# Patient Record
Sex: FEMALE | Race: WHITE | HISPANIC OR LATINO | Employment: UNEMPLOYED | URBAN - METROPOLITAN AREA
[De-identification: names, ages, dates, MRNs, and addresses within clinical notes are randomized per-mention and may not be internally consistent; named-entity substitution may affect disease eponyms.]

---

## 2020-04-14 ENCOUNTER — TELEPHONE (OUTPATIENT)
Dept: OBSTETRICS AND GYNECOLOGY | Facility: CLINIC | Age: 30
End: 2020-04-14

## 2020-04-14 NOTE — TELEPHONE ENCOUNTER
Spoke to patient and scheduled her appointment for 04/20/2020 at 10:00am to see Dr. Munguia at the [facility] location. Patient verbalized understanding.

## 2020-04-14 NOTE — TELEPHONE ENCOUNTER
----- Message from Ruben Graham sent at 4/14/2020 10:17 AM CDT -----  Contact: Halie ( [facility])   Would  Like to setup a new pt appt for ob check.         ..[phone]

## 2020-04-22 ENCOUNTER — OFFICE VISIT (OUTPATIENT)
Dept: OBSTETRICS AND GYNECOLOGY | Facility: CLINIC | Age: 30
End: 2020-04-22
Payer: MEDICAID

## 2020-04-22 ENCOUNTER — TELEPHONE (OUTPATIENT)
Dept: OBSTETRICS AND GYNECOLOGY | Facility: CLINIC | Age: 30
End: 2020-04-22

## 2020-04-22 ENCOUNTER — LAB VISIT (OUTPATIENT)
Dept: LAB | Facility: HOSPITAL | Age: 30
End: 2020-04-22
Attending: OBSTETRICS & GYNECOLOGY
Payer: MEDICAID

## 2020-04-22 ENCOUNTER — PROCEDURE VISIT (OUTPATIENT)
Dept: OBSTETRICS AND GYNECOLOGY | Facility: CLINIC | Age: 30
End: 2020-04-22
Payer: MEDICAID

## 2020-04-22 VITALS
DIASTOLIC BLOOD PRESSURE: 80 MMHG | BODY MASS INDEX: 29.78 KG/M2 | WEIGHT: 161.81 LBS | SYSTOLIC BLOOD PRESSURE: 138 MMHG | HEIGHT: 62 IN

## 2020-04-22 DIAGNOSIS — I10 ESSENTIAL HYPERTENSION: ICD-10-CM

## 2020-04-22 DIAGNOSIS — Z32.00 VISIT FOR CONFIRMATION OF PREGNANCY TEST RESULT WITH PHYSICAL EXAM: ICD-10-CM

## 2020-04-22 DIAGNOSIS — Z32.00 VISIT FOR CONFIRMATION OF PREGNANCY TEST RESULT WITH PHYSICAL EXAM: Primary | ICD-10-CM

## 2020-04-22 LAB
ALBUMIN SERPL BCP-MCNC: 3.6 G/DL (ref 3.5–5.2)
ALP SERPL-CCNC: 76 U/L (ref 55–135)
ALT SERPL W/O P-5'-P-CCNC: 22 U/L (ref 10–44)
ANION GAP SERPL CALC-SCNC: 8 MMOL/L (ref 8–16)
AST SERPL-CCNC: 17 U/L (ref 10–40)
BACTERIA #/AREA URNS HPF: ABNORMAL /HPF
BASOPHILS # BLD AUTO: 0.05 K/UL (ref 0–0.2)
BASOPHILS NFR BLD: 0.4 % (ref 0–1.9)
BILIRUB SERPL-MCNC: 0.1 MG/DL (ref 0.1–1)
BILIRUB UR QL STRIP: NEGATIVE
BUN SERPL-MCNC: 6 MG/DL (ref 6–20)
CALCIUM SERPL-MCNC: 9.3 MG/DL (ref 8.7–10.5)
CAOX CRY URNS QL MICRO: ABNORMAL
CHLORIDE SERPL-SCNC: 105 MMOL/L (ref 95–110)
CLARITY UR: ABNORMAL
CO2 SERPL-SCNC: 24 MMOL/L (ref 23–29)
COLOR UR: YELLOW
CREAT SERPL-MCNC: 0.7 MG/DL (ref 0.5–1.4)
CREAT UR-MCNC: 85 MG/DL (ref 15–325)
DIFFERENTIAL METHOD: ABNORMAL
EOSINOPHIL # BLD AUTO: 0.4 K/UL (ref 0–0.5)
EOSINOPHIL NFR BLD: 3.2 % (ref 0–8)
ERYTHROCYTE [DISTWIDTH] IN BLOOD BY AUTOMATED COUNT: 12.9 % (ref 11.5–14.5)
EST. GFR  (AFRICAN AMERICAN): >60 ML/MIN/1.73 M^2
EST. GFR  (NON AFRICAN AMERICAN): >60 ML/MIN/1.73 M^2
GLUCOSE SERPL-MCNC: 75 MG/DL (ref 70–110)
GLUCOSE UR QL STRIP: NEGATIVE
HCT VFR BLD AUTO: 39.3 % (ref 37–48.5)
HGB BLD-MCNC: 11.9 G/DL (ref 12–16)
HGB UR QL STRIP: ABNORMAL
IMM GRANULOCYTES # BLD AUTO: 0.06 K/UL (ref 0–0.04)
IMM GRANULOCYTES NFR BLD AUTO: 0.5 % (ref 0–0.5)
KETONES UR QL STRIP: NEGATIVE
LEUKOCYTE ESTERASE UR QL STRIP: ABNORMAL
LYMPHOCYTES # BLD AUTO: 3 K/UL (ref 1–4.8)
LYMPHOCYTES NFR BLD: 26.7 % (ref 18–48)
MCH RBC QN AUTO: 28.5 PG (ref 27–31)
MCHC RBC AUTO-ENTMCNC: 30.3 G/DL (ref 32–36)
MCV RBC AUTO: 94 FL (ref 82–98)
MICROSCOPIC COMMENT: ABNORMAL
MONOCYTES # BLD AUTO: 0.6 K/UL (ref 0.3–1)
MONOCYTES NFR BLD: 5.1 % (ref 4–15)
NEUTROPHILS # BLD AUTO: 7.3 K/UL (ref 1.8–7.7)
NEUTROPHILS NFR BLD: 64.1 % (ref 38–73)
NITRITE UR QL STRIP: POSITIVE
NRBC BLD-RTO: 0 /100 WBC
PH UR STRIP: 6 [PH] (ref 5–8)
PLATELET # BLD AUTO: 272 K/UL (ref 150–350)
PMV BLD AUTO: 10.6 FL (ref 9.2–12.9)
POTASSIUM SERPL-SCNC: 3.7 MMOL/L (ref 3.5–5.1)
PROT SERPL-MCNC: 7.3 G/DL (ref 6–8.4)
PROT UR QL STRIP: NEGATIVE
PROT UR-MCNC: <7 MG/DL (ref 0–15)
PROT/CREAT UR: NORMAL MG/G{CREAT} (ref 0–0.2)
RBC # BLD AUTO: 4.18 M/UL (ref 4–5.4)
RBC #/AREA URNS HPF: 1 /HPF (ref 0–4)
SODIUM SERPL-SCNC: 137 MMOL/L (ref 136–145)
SP GR UR STRIP: 1.02 (ref 1–1.03)
SQUAMOUS #/AREA URNS HPF: 6 /HPF
URN SPEC COLLECT METH UR: ABNORMAL
WBC # BLD AUTO: 11.4 K/UL (ref 3.9–12.7)
WBC #/AREA URNS HPF: 10 /HPF (ref 0–5)

## 2020-04-22 PROCEDURE — 86850 RBC ANTIBODY SCREEN: CPT

## 2020-04-22 PROCEDURE — 87088 URINE BACTERIA CULTURE: CPT

## 2020-04-22 PROCEDURE — 85025 COMPLETE CBC W/AUTO DIFF WBC: CPT

## 2020-04-22 PROCEDURE — 99204 OFFICE O/P NEW MOD 45 MIN: CPT | Mod: S$PBB,,, | Performed by: OBSTETRICS & GYNECOLOGY

## 2020-04-22 PROCEDURE — 87186 SC STD MICRODIL/AGAR DIL: CPT

## 2020-04-22 PROCEDURE — 76801 PR US, OB <14WKS, TRANSABD, SINGLE GESTATION: ICD-10-PCS | Mod: 26,S$PBB,, | Performed by: OBSTETRICS & GYNECOLOGY

## 2020-04-22 PROCEDURE — 99999 PR PBB SHADOW E&M-EST. PATIENT-LVL III: CPT | Mod: PBBFAC,,, | Performed by: OBSTETRICS & GYNECOLOGY

## 2020-04-22 PROCEDURE — 86703 HIV-1/HIV-2 1 RESULT ANTBDY: CPT

## 2020-04-22 PROCEDURE — 86762 RUBELLA ANTIBODY: CPT

## 2020-04-22 PROCEDURE — 87340 HEPATITIS B SURFACE AG IA: CPT

## 2020-04-22 PROCEDURE — 99999 PR PBB SHADOW E&M-EST. PATIENT-LVL III: ICD-10-PCS | Mod: PBBFAC,,, | Performed by: OBSTETRICS & GYNECOLOGY

## 2020-04-22 PROCEDURE — 80053 COMPREHEN METABOLIC PANEL: CPT

## 2020-04-22 PROCEDURE — 99213 OFFICE O/P EST LOW 20 MIN: CPT | Mod: PBBFAC,25 | Performed by: OBSTETRICS & GYNECOLOGY

## 2020-04-22 PROCEDURE — 87491 CHLMYD TRACH DNA AMP PROBE: CPT

## 2020-04-22 PROCEDURE — 99204 PR OFFICE/OUTPT VISIT, NEW, LEVL IV, 45-59 MIN: ICD-10-PCS | Mod: S$PBB,,, | Performed by: OBSTETRICS & GYNECOLOGY

## 2020-04-22 PROCEDURE — 84156 ASSAY OF PROTEIN URINE: CPT

## 2020-04-22 PROCEDURE — 81000 URINALYSIS NONAUTO W/SCOPE: CPT

## 2020-04-22 PROCEDURE — 87086 URINE CULTURE/COLONY COUNT: CPT

## 2020-04-22 PROCEDURE — 87077 CULTURE AEROBIC IDENTIFY: CPT

## 2020-04-22 PROCEDURE — 76801 OB US < 14 WKS SINGLE FETUS: CPT | Mod: PBBFAC | Performed by: OBSTETRICS & GYNECOLOGY

## 2020-04-22 PROCEDURE — 36415 COLL VENOUS BLD VENIPUNCTURE: CPT

## 2020-04-22 PROCEDURE — 76801 OB US < 14 WKS SINGLE FETUS: CPT | Mod: 26,S$PBB,, | Performed by: OBSTETRICS & GYNECOLOGY

## 2020-04-22 PROCEDURE — 86592 SYPHILIS TEST NON-TREP QUAL: CPT

## 2020-04-22 RX ORDER — ONDANSETRON 4 MG/1
4 TABLET, FILM COATED ORAL EVERY 8 HOURS PRN
COMMUNITY
Start: 2018-10-30 | End: 2020-05-20

## 2020-04-22 RX ORDER — LABETALOL 100 MG/1
100 TABLET, FILM COATED ORAL 2 TIMES DAILY
COMMUNITY
Start: 2020-04-14 | End: 2020-05-20 | Stop reason: SDUPTHER

## 2020-04-22 RX ORDER — PNV NO.95/FERROUS FUM/FOLIC AC 28MG-0.8MG
TABLET ORAL
COMMUNITY
Start: 2020-04-14

## 2020-04-22 NOTE — TELEPHONE ENCOUNTER
----- Message from Fartun Munguia MD sent at 4/22/2020  3:12 PM CDT -----  Can you instruct pt to start taking baby ASA next week. Forgot to tell her at visit

## 2020-04-22 NOTE — TELEPHONE ENCOUNTER
Used language line to contact pt, notified to start baby aspirin daily starting next week. Patient verbalized understanding

## 2020-04-22 NOTE — PROGRESS NOTES
CC: Well woman exam    Essie JOAQUIN is a 29 y.o. female  presents for pregnancy confirmation visit. LMP: Patient's last menstrual period was 2020. h/o HTN, was on lisinopril, not placed on labetalol 100 mg bid.  No issues, problems, or complaints.  **Irish speaking,  used    Past Medical History:   Diagnosis Date    Hypertension      History reviewed. No pertinent surgical history.  Social History     Socioeconomic History    Marital status: Single     Spouse name: Not on file    Number of children: Not on file    Years of education: Not on file    Highest education level: Not on file   Occupational History    Not on file   Social Needs    Financial resource strain: Not on file    Food insecurity:     Worry: Not on file     Inability: Not on file    Transportation needs:     Medical: Not on file     Non-medical: Not on file   Tobacco Use    Smoking status: Never Smoker    Smokeless tobacco: Never Used   Substance and Sexual Activity    Alcohol use: Never     Frequency: Never    Drug use: Never    Sexual activity: Yes     Partners: Male     Birth control/protection: None   Lifestyle    Physical activity:     Days per week: Not on file     Minutes per session: Not on file    Stress: Not on file   Relationships    Social connections:     Talks on phone: Not on file     Gets together: Not on file     Attends Church service: Not on file     Active member of club or organization: Not on file     Attends meetings of clubs or organizations: Not on file     Relationship status: Not on file   Other Topics Concern    Not on file   Social History Narrative    Not on file     Family History   Problem Relation Age of Onset    Diabetes Mother     Hypertension Mother     Diabetes Sister     Hypertension Sister      OB History        5    Para   4    Term   3       1    AB        Living   4       SAB        TAB        Ectopic        Multiple        Live  "Births   4                 Current Outpatient Medications:     labetaloL (NORMODYNE) 100 MG tablet, Take 100 mg by mouth 2 (two) times a day., Disp: , Rfl:     ondansetron (ZOFRAN) 4 MG tablet, Take 4 mg by mouth every 8 (eight) hours as needed., Disp: , Rfl:     PRENATAL 28 mg iron- 800 mcg Tab, TK 1 T PO  D, Disp: , Rfl:     GYNECOLOGY HISTORY:  No abnormal pap/std    DATA REVIEWED:  Last pap: 2019 Results: normal    /80   Ht 5' 2" (1.575 m)   Wt 73.4 kg (161 lb 13.1 oz)   LMP 02/02/2020   BMI 29.60 kg/m²     ROS:  GENERAL: Denies weight gain or weight loss. Feeling well overall.   SKIN: Denies rash or lesions.   HEAD: Denies head injury or headache.   NODES: Denies enlarged lymph nodes.   CHEST: Denies chest pain or shortness of breath.   CARDIOVASCULAR: Denies palpitations or left sided chest pain.   ABDOMEN: No abdominal pain, constipation, diarrhea, nausea, vomiting or rectal bleeding.   URINARY: No frequency, dysuria, hematuria, or burning on urination.  REPRODUCTIVE: See HPI.   BREASTS: The patient denies pain, lumps, or nipple discharge.   HEMATOLOGIC: No easy bruisability or excessive bleeding.   MUSCULOSKELETAL: Denies joint pain or swelling.   NEUROLOGIC: Denies syncope or weakness.   PSYCHIATRIC: Denies depression, anxiety or mood swings.    PHYSICAL EXAM:    APPEARANCE: Well nourished, well developed, in no acute distress.  AFFECT: WNL, alert and oriented x 3  SKIN: No acne or hirsutism  NECK: Neck symmetric without masses or thyromegaly  NODES: No inguinal, cervical, axillary, or femoral lymph node enlargement  CHEST: Good respiratory effect  ABDOMEN: Soft.  No tenderness or masses.  No hepatosplenomegaly.  No hernias.  BREASTS: Symmetrical, no skin changes or visible lesions.  No palpable masses, nipple discharge bilaterally.  PELVIC: Normal external genitalia without lesions.  Normal hair distribution.  Adequate perineal body, normal urethral meatus.  Vagina moist and well rugated " without lesions or discharge.  Cervix pink, without lesions, discharge or tenderness.  No significant cystocele or rectocele.  Bimanual exam shows uterus to be normal size, regular, mobile and nontender.  Adnexa without masses or tenderness.    EXTREMITIES: No edema.     u/s: viable pregnancy 11w4d, HARRIETT 20    Visit for confirmation of pregnancy test result with physical exam  -     US OB/GYN Procedure ([facility]); Future  -     CBC auto differential; Future; Expected date: 2020  -     Hepatitis B Surface Antigen; Future; Expected date: 2020  -     HIV 1/2 Ag/Ab (4th Gen); Future; Expected date: 2020  -     Urinalysis  -     Type & Screen; Future; Expected date: 2020  -     Rubella Antibody, IgG; Future; Expected date: 2020  -     RPR; Future; Expected date: 2020  -     Urine culture  -     Comprehensive metabolic panel; Future; Expected date: 2020  -     Protein / creatinine ratio, urine  -     C. trachomatis/N. gonorrhoeae by AMP DNA    Essential hypertension  -     CBC auto differential; Future; Expected date: 2020  -     Hepatitis B Surface Antigen; Future; Expected date: 2020  -     HIV 1/2 Ag/Ab (4th Gen); Future; Expected date: 2020  -     Urinalysis  -     Type & Screen; Future; Expected date: 2020  -     Rubella Antibody, IgG; Future; Expected date: 2020  -     RPR; Future; Expected date: 2020  -     Urine culture  -     Comprehensive metabolic panel; Future; Expected date: 2020  -     Protein / creatinine ratio, urine    Patient was counseled today on  options. Ok to continue on labetalol, will need to start baby ASA next week

## 2020-04-23 ENCOUNTER — TELEPHONE (OUTPATIENT)
Dept: OBSTETRICS AND GYNECOLOGY | Facility: CLINIC | Age: 30
End: 2020-04-23

## 2020-04-23 LAB
ABO + RH BLD: NORMAL
BLD GP AB SCN CELLS X3 SERPL QL: NORMAL
HBV SURFACE AG SERPL QL IA: NEGATIVE
HIV 1+2 AB+HIV1 P24 AG SERPL QL IA: NEGATIVE
RPR SER QL: NORMAL
RUBV IGG SER-ACNC: 107 IU/ML
RUBV IGG SER-IMP: REACTIVE

## 2020-04-23 RX ORDER — NITROFURANTOIN 25; 75 MG/1; MG/1
100 CAPSULE ORAL 2 TIMES DAILY
Qty: 10 CAPSULE | Refills: 0 | Status: SHIPPED | OUTPATIENT
Start: 2020-04-23 | End: 2020-04-28

## 2020-04-23 NOTE — TELEPHONE ENCOUNTER
----- Message from Fartun Munguia MD sent at 4/23/2020  7:42 AM CDT -----  Pt has UTI. macrobid escripted

## 2020-04-24 LAB
C TRACH DNA SPEC QL NAA+PROBE: NOT DETECTED
N GONORRHOEA DNA SPEC QL NAA+PROBE: NOT DETECTED

## 2020-04-26 LAB — BACTERIA UR CULT: ABNORMAL

## 2020-05-15 ENCOUNTER — TELEPHONE (OUTPATIENT)
Dept: OBSTETRICS AND GYNECOLOGY | Facility: CLINIC | Age: 30
End: 2020-05-15

## 2020-05-15 NOTE — TELEPHONE ENCOUNTER
Forwarded Message to ALLISON Hernández for American translation. allison Adams    ----- Message from Rina Neal sent at 5/15/2020 12:57 PM CDT -----  Contact: Patient  Patient states that she was suppose to have a blood pressure monitor sent to her, but she is not sure, please call her back to clarify this patient will need a . Please call [phone]

## 2020-05-20 ENCOUNTER — INITIAL PRENATAL (OUTPATIENT)
Dept: OBSTETRICS AND GYNECOLOGY | Facility: CLINIC | Age: 30
End: 2020-05-20
Payer: MEDICAID

## 2020-05-20 VITALS — BODY MASS INDEX: 30.44 KG/M2 | WEIGHT: 166.44 LBS

## 2020-05-20 DIAGNOSIS — Z3A.15 PREGNANCY WITH 15 COMPLETED WEEKS GESTATION: ICD-10-CM

## 2020-05-20 DIAGNOSIS — O10.012 ESSENTIAL HYPERTENSION AFFECTING PREGNANCY IN SECOND TRIMESTER: Primary | ICD-10-CM

## 2020-05-20 PROCEDURE — 99212 PR OFFICE/OUTPT VISIT, EST, LEVL II, 10-19 MIN: ICD-10-PCS | Mod: TH,S$PBB,, | Performed by: OBSTETRICS & GYNECOLOGY

## 2020-05-20 PROCEDURE — 99999 PR PBB SHADOW E&M-EST. PATIENT-LVL II: CPT | Mod: PBBFAC,,, | Performed by: OBSTETRICS & GYNECOLOGY

## 2020-05-20 PROCEDURE — 99999 PR PBB SHADOW E&M-EST. PATIENT-LVL II: ICD-10-PCS | Mod: PBBFAC,,, | Performed by: OBSTETRICS & GYNECOLOGY

## 2020-05-20 PROCEDURE — 99212 OFFICE O/P EST SF 10 MIN: CPT | Mod: TH,S$PBB,, | Performed by: OBSTETRICS & GYNECOLOGY

## 2020-05-20 PROCEDURE — 99212 OFFICE O/P EST SF 10 MIN: CPT | Mod: PBBFAC,TH | Performed by: OBSTETRICS & GYNECOLOGY

## 2020-05-20 RX ORDER — LABETALOL 100 MG/1
100 TABLET, FILM COATED ORAL EVERY 12 HOURS
Qty: 60 TABLET | Refills: 6 | Status: ON HOLD | OUTPATIENT
Start: 2020-05-20 | End: 2020-08-21 | Stop reason: HOSPADM

## 2020-05-20 NOTE — PROGRESS NOTES
* on phone  Pt c/o bilateral feet pain & swelling-normal exam today; rec elevating feet  Also c/o occ left rib cage-normal abdominal exam; occ headaches-no vision changes; ok to take tylenol prn    /86. Did not take labetalol this morning, ran out yesterday  Next visit w/ Dr. Puente

## 2020-06-04 ENCOUNTER — TELEPHONE (OUTPATIENT)
Dept: OBSTETRICS AND GYNECOLOGY | Facility: CLINIC | Age: 30
End: 2020-06-04

## 2020-06-05 ENCOUNTER — TELEPHONE (OUTPATIENT)
Dept: OBSTETRICS AND GYNECOLOGY | Facility: CLINIC | Age: 30
End: 2020-06-05

## 2020-06-05 NOTE — TELEPHONE ENCOUNTER
Attempted to contact patient. No answer. Left message for patient to return call to clinic to assist in r/s her appointment. Patient is a Togolese speaker.

## 2020-06-12 ENCOUNTER — ROUTINE PRENATAL (OUTPATIENT)
Dept: OBSTETRICS AND GYNECOLOGY | Facility: CLINIC | Age: 30
End: 2020-06-12
Payer: MEDICAID

## 2020-06-12 VITALS
DIASTOLIC BLOOD PRESSURE: 88 MMHG | BODY MASS INDEX: 30.52 KG/M2 | WEIGHT: 166.88 LBS | SYSTOLIC BLOOD PRESSURE: 138 MMHG

## 2020-06-12 DIAGNOSIS — O10.012 PRE-EXISTING ESSENTIAL HTN COMP PREGNANCY, SECOND TRIMESTER: Primary | ICD-10-CM

## 2020-06-12 DIAGNOSIS — O09.892 HISTORY OF PRETERM DELIVERY, CURRENTLY PREGNANT IN SECOND TRIMESTER: ICD-10-CM

## 2020-06-12 DIAGNOSIS — Z87.59 HISTORY OF SEVERE PRE-ECLAMPSIA: ICD-10-CM

## 2020-06-12 PROCEDURE — 99212 OFFICE O/P EST SF 10 MIN: CPT | Mod: TH,S$PBB,, | Performed by: OBSTETRICS & GYNECOLOGY

## 2020-06-12 PROCEDURE — 99212 OFFICE O/P EST SF 10 MIN: CPT | Mod: PBBFAC,TH | Performed by: OBSTETRICS & GYNECOLOGY

## 2020-06-12 PROCEDURE — 99212 PR OFFICE/OUTPT VISIT, EST, LEVL II, 10-19 MIN: ICD-10-PCS | Mod: TH,S$PBB,, | Performed by: OBSTETRICS & GYNECOLOGY

## 2020-06-12 PROCEDURE — 99999 PR PBB SHADOW E&M-EST. PATIENT-LVL II: ICD-10-PCS | Mod: PBBFAC,,, | Performed by: OBSTETRICS & GYNECOLOGY

## 2020-06-12 PROCEDURE — 99999 PR PBB SHADOW E&M-EST. PATIENT-LVL II: CPT | Mod: PBBFAC,,, | Performed by: OBSTETRICS & GYNECOLOGY

## 2020-06-12 NOTE — PROGRESS NOTES
Pt reports no complaints.  Doing well.  Has been seeing Dr. Munguia and is transferring to me secondary to prefers [employer] communication.  History reviewed.  HTN with prior pregnancies with pre-eclampsia.  Last one was induced early for this reason.  Infant weighed 2 lb.  Denies any  labor history.  Risks and recommendations were reviewed with pt.  BP well controlled on Labetalol.  Daily ASA.  17-OHP not indicated.  Pt counseled on aneuploidy testing options.  Declines.  Anatomy ultrasound with next visit.  Second trimester talk.

## 2020-07-06 ENCOUNTER — ROUTINE PRENATAL (OUTPATIENT)
Dept: OBSTETRICS AND GYNECOLOGY | Facility: CLINIC | Age: 30
End: 2020-07-06
Payer: MEDICAID

## 2020-07-06 ENCOUNTER — PROCEDURE VISIT (OUTPATIENT)
Dept: OBSTETRICS AND GYNECOLOGY | Facility: CLINIC | Age: 30
End: 2020-07-06
Payer: MEDICAID

## 2020-07-06 VITALS
SYSTOLIC BLOOD PRESSURE: 132 MMHG | WEIGHT: 171.06 LBS | DIASTOLIC BLOOD PRESSURE: 80 MMHG | BODY MASS INDEX: 31.29 KG/M2

## 2020-07-06 DIAGNOSIS — O10.012 PRE-EXISTING ESSENTIAL HTN COMP PREGNANCY, SECOND TRIMESTER: ICD-10-CM

## 2020-07-06 DIAGNOSIS — O10.012 PRE-EXISTING ESSENTIAL HTN COMP PREGNANCY, SECOND TRIMESTER: Primary | ICD-10-CM

## 2020-07-06 PROCEDURE — 76805 OB US >/= 14 WKS SNGL FETUS: CPT | Mod: PBBFAC | Performed by: OBSTETRICS & GYNECOLOGY

## 2020-07-06 PROCEDURE — 99999 PR PBB SHADOW E&M-EST. PATIENT-LVL II: CPT | Mod: PBBFAC,,, | Performed by: OBSTETRICS & GYNECOLOGY

## 2020-07-06 PROCEDURE — 99212 OFFICE O/P EST SF 10 MIN: CPT | Mod: PBBFAC,TH | Performed by: OBSTETRICS & GYNECOLOGY

## 2020-07-06 PROCEDURE — 99212 OFFICE O/P EST SF 10 MIN: CPT | Mod: TH,S$PBB,, | Performed by: OBSTETRICS & GYNECOLOGY

## 2020-07-06 PROCEDURE — 99212 PR OFFICE/OUTPT VISIT, EST, LEVL II, 10-19 MIN: ICD-10-PCS | Mod: TH,S$PBB,, | Performed by: OBSTETRICS & GYNECOLOGY

## 2020-07-06 PROCEDURE — 76805 PR US, OB 14+WKS, TRANSABD, SINGLE GESTATION: ICD-10-PCS | Mod: 26,S$PBB,, | Performed by: OBSTETRICS & GYNECOLOGY

## 2020-07-06 PROCEDURE — 76805 OB US >/= 14 WKS SNGL FETUS: CPT | Mod: 26,S$PBB,, | Performed by: OBSTETRICS & GYNECOLOGY

## 2020-07-06 PROCEDURE — 99999 PR PBB SHADOW E&M-EST. PATIENT-LVL II: ICD-10-PCS | Mod: PBBFAC,,, | Performed by: OBSTETRICS & GYNECOLOGY

## 2020-07-06 NOTE — PROGRESS NOTES
US today: 430 g (18%), breech, 3VC, anterior placenta, NL fluid, CL normal, sub-optimal views  Pt reports no complaints and is doing well.  Counseled on ultrasound findings.  Repeat US with next visit for sub-optimal views.  Johanne-viability talk.  3rd trimester labs with next visit.  Labor precautions and kick counts.

## 2020-07-22 ENCOUNTER — TELEPHONE (OUTPATIENT)
Dept: OBSTETRICS AND GYNECOLOGY | Facility: CLINIC | Age: 30
End: 2020-07-22

## 2020-07-22 NOTE — TELEPHONE ENCOUNTER
----- Message from Pricila Mcnair sent at 7/22/2020  1:27 PM CDT -----  Pt is requesting a call back to reschedule appt on 08/03 for a later date. If poss would like to schedule after 08/11 Please call back at [phone]

## 2020-08-11 ENCOUNTER — TELEPHONE (OUTPATIENT)
Dept: OBSTETRICS AND GYNECOLOGY | Facility: CLINIC | Age: 30
End: 2020-08-11

## 2020-08-11 NOTE — TELEPHONE ENCOUNTER
Spoke to patient. Patient is c/o headaches that are not relieved by Tylenol. Patient stated that she is unable to take her blood pressure at home but is also feeling very weak and her eyes are hurting. Advised patient to go to L&D for eval. Patient verbalized understanding.

## 2020-08-14 ENCOUNTER — ROUTINE PRENATAL (OUTPATIENT)
Dept: OBSTETRICS AND GYNECOLOGY | Facility: CLINIC | Age: 30
End: 2020-08-14
Payer: MEDICAID

## 2020-08-14 ENCOUNTER — PROCEDURE VISIT (OUTPATIENT)
Dept: OBSTETRICS AND GYNECOLOGY | Facility: CLINIC | Age: 30
End: 2020-08-14
Payer: MEDICAID

## 2020-08-14 ENCOUNTER — HOSPITAL ENCOUNTER (INPATIENT)
Facility: HOSPITAL | Age: 30
LOS: 7 days | Discharge: HOME | End: 2020-08-21
Attending: OBSTETRICS & GYNECOLOGY | Admitting: OBSTETRICS & GYNECOLOGY
Payer: MEDICAID

## 2020-08-14 VITALS
BODY MASS INDEX: 31.94 KG/M2 | DIASTOLIC BLOOD PRESSURE: 110 MMHG | SYSTOLIC BLOOD PRESSURE: 160 MMHG | WEIGHT: 174.63 LBS

## 2020-08-14 DIAGNOSIS — O45.90 PLACENTA ABRUPTION, DELIVERED, CURRENT HOSPITALIZATION: ICD-10-CM

## 2020-08-14 DIAGNOSIS — O10.012 PRE-EXISTING ESSENTIAL HTN COMP PREGNANCY, SECOND TRIMESTER: ICD-10-CM

## 2020-08-14 DIAGNOSIS — Z87.59 HISTORY OF SEVERE PRE-ECLAMPSIA: ICD-10-CM

## 2020-08-14 DIAGNOSIS — O11.9 CHRONIC HYPERTENSION WITH SUPERIMPOSED PRE-ECLAMPSIA: ICD-10-CM

## 2020-08-14 DIAGNOSIS — O10.012 PRE-EXISTING ESSENTIAL HTN COMP PREGNANCY, SECOND TRIMESTER: Primary | ICD-10-CM

## 2020-08-14 DIAGNOSIS — Z98.891 STATUS POST PRIMARY LOW TRANSVERSE CESAREAN SECTION: Primary | ICD-10-CM

## 2020-08-14 LAB
ALBUMIN SERPL BCP-MCNC: 3 G/DL (ref 3.5–5.2)
ALP SERPL-CCNC: 149 U/L (ref 55–135)
ALT SERPL W/O P-5'-P-CCNC: 32 U/L (ref 10–44)
ANION GAP SERPL CALC-SCNC: 11 MMOL/L (ref 8–16)
AST SERPL-CCNC: 22 U/L (ref 10–40)
BASOPHILS # BLD AUTO: 0.05 K/UL (ref 0–0.2)
BASOPHILS NFR BLD: 0.4 % (ref 0–1.9)
BILIRUB SERPL-MCNC: 0.2 MG/DL (ref 0.1–1)
BUN SERPL-MCNC: 11 MG/DL (ref 6–20)
CALCIUM SERPL-MCNC: 9.1 MG/DL (ref 8.7–10.5)
CHLORIDE SERPL-SCNC: 106 MMOL/L (ref 95–110)
CO2 SERPL-SCNC: 20 MMOL/L (ref 23–29)
CREAT SERPL-MCNC: 0.7 MG/DL (ref 0.5–1.4)
CREAT UR-MCNC: 48.4 MG/DL (ref 15–325)
DIFFERENTIAL METHOD: ABNORMAL
EOSINOPHIL # BLD AUTO: 0.2 K/UL (ref 0–0.5)
EOSINOPHIL NFR BLD: 2 % (ref 0–8)
ERYTHROCYTE [DISTWIDTH] IN BLOOD BY AUTOMATED COUNT: 12.6 % (ref 11.5–14.5)
EST. GFR  (AFRICAN AMERICAN): >60 ML/MIN/1.73 M^2
EST. GFR  (NON AFRICAN AMERICAN): >60 ML/MIN/1.73 M^2
GLUCOSE SERPL-MCNC: 66 MG/DL (ref 70–110)
HCT VFR BLD AUTO: 34.7 % (ref 37–48.5)
HGB BLD-MCNC: 11.5 G/DL (ref 12–16)
IMM GRANULOCYTES # BLD AUTO: 0.06 K/UL (ref 0–0.04)
IMM GRANULOCYTES NFR BLD AUTO: 0.5 % (ref 0–0.5)
LYMPHOCYTES # BLD AUTO: 3 K/UL (ref 1–4.8)
LYMPHOCYTES NFR BLD: 27 % (ref 18–48)
MCH RBC QN AUTO: 29.6 PG (ref 27–31)
MCHC RBC AUTO-ENTMCNC: 33.1 G/DL (ref 32–36)
MCV RBC AUTO: 89 FL (ref 82–98)
MONOCYTES # BLD AUTO: 0.5 K/UL (ref 0.3–1)
MONOCYTES NFR BLD: 4.4 % (ref 4–15)
NEUTROPHILS # BLD AUTO: 7.3 K/UL (ref 1.8–7.7)
NEUTROPHILS NFR BLD: 65.7 % (ref 38–73)
NRBC BLD-RTO: 0 /100 WBC
PLATELET # BLD AUTO: 246 K/UL (ref 150–350)
PMV BLD AUTO: 11.2 FL (ref 9.2–12.9)
POTASSIUM SERPL-SCNC: 4.1 MMOL/L (ref 3.5–5.1)
PROT SERPL-MCNC: 7 G/DL (ref 6–8.4)
PROT UR-MCNC: 52 MG/DL (ref 0–15)
PROT/CREAT UR: 1.07 MG/G{CREAT} (ref 0–0.2)
RBC # BLD AUTO: 3.89 M/UL (ref 4–5.4)
SARS-COV-2 RDRP RESP QL NAA+PROBE: NEGATIVE
SODIUM SERPL-SCNC: 137 MMOL/L (ref 136–145)
WBC # BLD AUTO: 11.18 K/UL (ref 3.9–12.7)

## 2020-08-14 PROCEDURE — 80053 COMPREHEN METABOLIC PANEL: CPT

## 2020-08-14 PROCEDURE — 85025 COMPLETE CBC W/AUTO DIFF WBC: CPT

## 2020-08-14 PROCEDURE — 63600175 PHARM REV CODE 636 W HCPCS: Performed by: MIDWIFE

## 2020-08-14 PROCEDURE — 99212 OFFICE O/P EST SF 10 MIN: CPT | Mod: PBBFAC,TH | Performed by: OBSTETRICS & GYNECOLOGY

## 2020-08-14 PROCEDURE — 25000003 PHARM REV CODE 250: Performed by: ADVANCED PRACTICE MIDWIFE

## 2020-08-14 PROCEDURE — 72100003 HC LABOR CARE, EA. ADDL. 8 HRS

## 2020-08-14 PROCEDURE — 76816 OB US FOLLOW-UP PER FETUS: CPT | Mod: PBBFAC | Performed by: OBSTETRICS & GYNECOLOGY

## 2020-08-14 PROCEDURE — 76816 OB US FOLLOW-UP PER FETUS: CPT | Mod: 26,S$PBB,, | Performed by: OBSTETRICS & GYNECOLOGY

## 2020-08-14 PROCEDURE — 99999 PR PBB SHADOW E&M-EST. PATIENT-LVL II: ICD-10-PCS | Mod: PBBFAC,,, | Performed by: OBSTETRICS & GYNECOLOGY

## 2020-08-14 PROCEDURE — 76816 PR  US,PREGNANT UTERUS,F/U,TRANSABD APP: ICD-10-PCS | Mod: 26,S$PBB,, | Performed by: OBSTETRICS & GYNECOLOGY

## 2020-08-14 PROCEDURE — 25000003 PHARM REV CODE 250: Performed by: OBSTETRICS & GYNECOLOGY

## 2020-08-14 PROCEDURE — 99999 PR PBB SHADOW E&M-EST. PATIENT-LVL II: CPT | Mod: PBBFAC,,, | Performed by: OBSTETRICS & GYNECOLOGY

## 2020-08-14 PROCEDURE — 72100002 HC LABOR CARE, 1ST 8 HOURS

## 2020-08-14 PROCEDURE — 63600175 PHARM REV CODE 636 W HCPCS: Performed by: ADVANCED PRACTICE MIDWIFE

## 2020-08-14 PROCEDURE — 25000003 PHARM REV CODE 250: Performed by: MIDWIFE

## 2020-08-14 PROCEDURE — U0002 COVID-19 LAB TEST NON-CDC: HCPCS

## 2020-08-14 PROCEDURE — 99212 OFFICE O/P EST SF 10 MIN: CPT | Mod: TH,S$PBB,, | Performed by: OBSTETRICS & GYNECOLOGY

## 2020-08-14 PROCEDURE — 84156 ASSAY OF PROTEIN URINE: CPT

## 2020-08-14 PROCEDURE — 11000001 HC ACUTE MED/SURG PRIVATE ROOM

## 2020-08-14 PROCEDURE — 51702 INSERT TEMP BLADDER CATH: CPT

## 2020-08-14 PROCEDURE — 99212 PR OFFICE/OUTPT VISIT, EST, LEVL II, 10-19 MIN: ICD-10-PCS | Mod: TH,S$PBB,, | Performed by: OBSTETRICS & GYNECOLOGY

## 2020-08-14 RX ORDER — BETAMETHASONE SODIUM PHOSPHATE AND BETAMETHASONE ACETATE 3; 3 MG/ML; MG/ML
12 INJECTION, SUSPENSION INTRA-ARTICULAR; INTRALESIONAL; INTRAMUSCULAR; SOFT TISSUE
Status: COMPLETED | OUTPATIENT
Start: 2020-08-14 | End: 2020-08-15

## 2020-08-14 RX ORDER — MAGNESIUM SULFATE HEPTAHYDRATE 40 MG/ML
2 INJECTION, SOLUTION INTRAVENOUS CONTINUOUS
Status: DISCONTINUED | OUTPATIENT
Start: 2020-08-14 | End: 2020-08-17

## 2020-08-14 RX ORDER — CALCIUM GLUCONATE 98 MG/ML
1 INJECTION, SOLUTION INTRAVENOUS
Status: DISCONTINUED | OUTPATIENT
Start: 2020-08-14 | End: 2020-08-17

## 2020-08-14 RX ORDER — LABETALOL HCL 20 MG/4 ML
20 SYRINGE (ML) INTRAVENOUS ONCE
Status: COMPLETED | OUTPATIENT
Start: 2020-08-14 | End: 2020-08-14

## 2020-08-14 RX ORDER — SODIUM CHLORIDE, SODIUM LACTATE, POTASSIUM CHLORIDE, CALCIUM CHLORIDE 600; 310; 30; 20 MG/100ML; MG/100ML; MG/100ML; MG/100ML
INJECTION, SOLUTION INTRAVENOUS CONTINUOUS
Status: DISCONTINUED | OUTPATIENT
Start: 2020-08-14 | End: 2020-08-17

## 2020-08-14 RX ORDER — LABETALOL HYDROCHLORIDE 5 MG/ML
40 INJECTION, SOLUTION INTRAVENOUS ONCE AS NEEDED
Status: COMPLETED | OUTPATIENT
Start: 2020-08-14 | End: 2020-08-14

## 2020-08-14 RX ORDER — MAGNESIUM SULFATE HEPTAHYDRATE 40 MG/ML
6 INJECTION, SOLUTION INTRAVENOUS ONCE
Status: COMPLETED | OUTPATIENT
Start: 2020-08-14 | End: 2020-08-14

## 2020-08-14 RX ORDER — HYDRALAZINE HYDROCHLORIDE 20 MG/ML
10 INJECTION INTRAMUSCULAR; INTRAVENOUS ONCE AS NEEDED
Status: COMPLETED | OUTPATIENT
Start: 2020-08-14 | End: 2020-08-14

## 2020-08-14 RX ORDER — MORPHINE SULFATE 4 MG/ML
4 INJECTION, SOLUTION INTRAMUSCULAR; INTRAVENOUS ONCE
Status: COMPLETED | OUTPATIENT
Start: 2020-08-14 | End: 2020-08-14

## 2020-08-14 RX ORDER — LABETALOL HYDROCHLORIDE 5 MG/ML
20 INJECTION, SOLUTION INTRAVENOUS ONCE AS NEEDED
Status: COMPLETED | OUTPATIENT
Start: 2020-08-14 | End: 2020-08-14

## 2020-08-14 RX ORDER — ACETAMINOPHEN 325 MG/1
650 TABLET ORAL EVERY 4 HOURS PRN
Status: DISCONTINUED | OUTPATIENT
Start: 2020-08-14 | End: 2020-08-17

## 2020-08-14 RX ORDER — BUTALBITAL, ACETAMINOPHEN AND CAFFEINE 50; 325; 40 MG/1; MG/1; MG/1
2 TABLET ORAL EVERY 6 HOURS PRN
Status: DISCONTINUED | OUTPATIENT
Start: 2020-08-14 | End: 2020-08-17

## 2020-08-14 RX ORDER — LABETALOL HCL 20 MG/4 ML
40 SYRINGE (ML) INTRAVENOUS ONCE AS NEEDED
Status: COMPLETED | OUTPATIENT
Start: 2020-08-14 | End: 2020-08-14

## 2020-08-14 RX ORDER — LABETALOL 200 MG/1
400 TABLET, FILM COATED ORAL 3 TIMES DAILY
Status: DISCONTINUED | OUTPATIENT
Start: 2020-08-14 | End: 2020-08-21 | Stop reason: HOSPADM

## 2020-08-14 RX ORDER — IBUPROFEN 600 MG/1
600 TABLET ORAL
Status: DISCONTINUED | OUTPATIENT
Start: 2020-08-14 | End: 2020-08-14

## 2020-08-14 RX ORDER — LABETALOL HCL 20 MG/4 ML
10 SYRINGE (ML) INTRAVENOUS ONCE
Status: DISCONTINUED | OUTPATIENT
Start: 2020-08-14 | End: 2020-08-14

## 2020-08-14 RX ORDER — LABETALOL HYDROCHLORIDE 5 MG/ML
80 INJECTION, SOLUTION INTRAVENOUS ONCE AS NEEDED
Status: COMPLETED | OUTPATIENT
Start: 2020-08-14 | End: 2020-08-14

## 2020-08-14 RX ORDER — ACETAMINOPHEN 325 MG/1
650 TABLET ORAL ONCE
Status: COMPLETED | OUTPATIENT
Start: 2020-08-14 | End: 2020-08-14

## 2020-08-14 RX ADMIN — LABETALOL HYDROCHLORIDE 400 MG: 200 TABLET, FILM COATED ORAL at 09:08

## 2020-08-14 RX ADMIN — MAGNESIUM SULFATE HEPTAHYDRATE 6 G: 40 INJECTION, SOLUTION INTRAVENOUS at 01:08

## 2020-08-14 RX ADMIN — MORPHINE SULFATE 4 MG: 4 INJECTION INTRAVENOUS at 11:08

## 2020-08-14 RX ADMIN — BETAMETHASONE SODIUM PHOSPHATE AND BETAMETHASONE ACETATE 12 MG: 3; 3 INJECTION, SUSPENSION INTRA-ARTICULAR; INTRALESIONAL; INTRAMUSCULAR at 01:08

## 2020-08-14 RX ADMIN — LABETALOL HYDROCHLORIDE 20 MG: 5 INJECTION, SOLUTION INTRAVENOUS at 01:08

## 2020-08-14 RX ADMIN — LABETALOL HYDROCHLORIDE 40 MG: 5 INJECTION, SOLUTION INTRAVENOUS at 03:08

## 2020-08-14 RX ADMIN — HYDRALAZINE HYDROCHLORIDE 10 MG: 20 INJECTION INTRAMUSCULAR; INTRAVENOUS at 04:08

## 2020-08-14 RX ADMIN — SODIUM CHLORIDE, SODIUM LACTATE, POTASSIUM CHLORIDE, AND CALCIUM CHLORIDE: .6; .31; .03; .02 INJECTION, SOLUTION INTRAVENOUS at 01:08

## 2020-08-14 RX ADMIN — LABETALOL HYDROCHLORIDE 40 MG: 5 INJECTION, SOLUTION INTRAVENOUS at 01:08

## 2020-08-14 RX ADMIN — BUTALBITAL, ACETAMINOPHEN AND CAFFEINE 2 TABLET: 50; 325; 40 TABLET ORAL at 09:08

## 2020-08-14 RX ADMIN — ACETAMINOPHEN 650 MG: 325 TABLET ORAL at 06:08

## 2020-08-14 RX ADMIN — MAGNESIUM SULFATE HEPTAHYDRATE 2 G/HR: 40 INJECTION, SOLUTION INTRAVENOUS at 01:08

## 2020-08-14 RX ADMIN — LABETALOL HYDROCHLORIDE 80 MG: 5 INJECTION, SOLUTION INTRAVENOUS at 03:08

## 2020-08-14 RX ADMIN — LABETALOL HYDROCHLORIDE 20 MG: 5 INJECTION, SOLUTION INTRAVENOUS at 03:08

## 2020-08-14 NOTE — HPI
Sent from office due to increased blood pressures, hx of pre-eclampsia with PTB, baby breech on today's ultrasound

## 2020-08-14 NOTE — SUBJECTIVE & OBJECTIVE
Obstetric HPI:  Patient reports None contractions, active fetal movement, No vaginal bleeding , No loss of fluid     This pregnancy has been complicated by   Hx pre-eclampsia with PTB  Breech presentation of fetus on ultrasound today in clinic  CHTN, labetalol 100mg po BID    OB History    Para Term  AB Living   5 4 3 1 0 4   SAB TAB Ectopic Multiple Live Births   0 0 0 0 4      # Outcome Date GA Lbr Frank/2nd Weight Sex Delivery Anes PTL Lv   5 Current            4  08/29/15   0.907 kg (2 lb) M Vag-Spont  N MARI      Birth Comments: Induced early secondary to Severe Pre-eclampsia      Complications: Preeclampsia   3 Term 13   3.26 kg (7 lb 3 oz) M Vag-Spont  N MARI   2 Term 11   3.175 kg (7 lb) M Vag-Spont  N MARI      Complications: HTN in pregnancy, chronic   1 Term 06   2.948 kg (6 lb 8 oz) M Vag-Spont  N MARI     Past Medical History:   Diagnosis Date    Hypertension      No past surgical history on file.    PTA Medications   Medication Sig    labetaloL (NORMODYNE) 100 MG tablet Take 1 tablet (100 mg total) by mouth every 12 (twelve) hours.    PRENATAL 28 mg iron- 800 mcg Tab TK 1 T PO  D       Review of patient's allergies indicates:  No Known Allergies     Family History     Problem Relation (Age of Onset)    Diabetes Mother, Sister    Hypertension Mother, Sister        Tobacco Use    Smoking status: Never Smoker    Smokeless tobacco: Never Used   Substance and Sexual Activity    Alcohol use: Never     Frequency: Never    Drug use: Never    Sexual activity: Yes     Partners: Male     Birth control/protection: None     Review of Systems   Eyes: Negative for visual disturbance.   Gastrointestinal: Negative for abdominal pain.   Genitourinary: Negative for vaginal bleeding and vaginal discharge.   Neurological: Negative for headaches.   All other systems reviewed and are negative.     Objective:     Vital Signs (Most Recent):    Vital Signs (24h Range):  BP:  (160-190)/(110) 160/110        There is no height or weight on file to calculate BMI.    FHT: 145 bpm with moderate variability, no decelerations, Cat 1   TOCO:  None    Physical Exam:   Constitutional: She is oriented to person, place, and time. She appears well-developed and well-nourished.    HENT:   Head: Normocephalic.     Neck: Normal range of motion.    Cardiovascular: Normal rate and regular rhythm.     Pulmonary/Chest: Effort normal and breath sounds normal.        Abdominal: Soft. There is no abdominal tenderness.   Gravid             Musculoskeletal: Normal range of motion.       Neurological: She is alert and oriented to person, place, and time. She has normal reflexes.    Skin: Skin is warm and dry.   No edema noted    Psychiatric: She has a normal mood and affect. Her behavior is normal. Judgment and thought content normal.       Cervix:  Deferred     Significant Labs:  Lab Results   Component Value Date    GROUPTRH O POS 04/22/2020    HEPBSAG Negative 04/22/2020       I have personallly reviewed all pertinent lab results from the last 24 hours.

## 2020-08-14 NOTE — HOSPITAL COURSE
Upon admission to L&D, bp in the severely elevated range, patient hyperreflexic, and labs significant for urine P/C ratio 1.07.    IV magnesium sulfate for seizure prophylaxis and fetal neuroprotection started.  Betamethasone series initiated.  IV labetalol/hydralazine protocol started.  8/15- BP stable on labetalol 400mg tid.   8/16/20-placental abruption  8/16/20-primary low transverse c/section , severe preE, placental abruption; breech  8/17/20-magnesium sulfate for 24 hrs; bp controlled with labetalol 400mg tid, procardia 30 bid  8/18/20-transferred to [facility]  Patient was monitored closely for BP control with adjustments as needed, will be discharge POD4 on labetalol 400 mg TID and Procardia XL 60 mg BID

## 2020-08-14 NOTE — ASSESSMENT & PLAN NOTE
Takes labetalol 100mg po BID  Admit to L&D  Magnesium sulfate therapy   BMZ series begun   IV Labetalol/hydralazine per protocol   Reviewed lab findings and diagnosis of superimposed preeclampsia with the patient via the language line.  Explained the need for inpatient management until delivery.  Deliver by 34wga or sooner if bp cannot be controlled, development of any other severe features of preeclampsia, or worsening fetal well-being.  All questions answered.

## 2020-08-14 NOTE — PROGRESS NOTES
[facility] -   Obstetrics  Antepartum Progress Note    Patient Name: Essie Bernal  MRN: [id number]  Admission Date: 8/14/2020  Hospital Length of Stay: 0 days  Attending Physician: Yuliya Post MD  Primary Care Provider: Primary Doctor No    Subjective:     Principal Problem:Chronic hypertension with superimposed pre-eclampsia    HPI:  Sent from office due to increased blood pressures, hx of pre-eclampsia with PTB, baby breech on today's ultrasound    Hospital Course:  Upon admission to L&D, bp in the severely elevated range, patient hyperreflexic, and labs significant for urine P/C ratio 1.07.    IV magnesium sulfate for seizure prophylaxis and fetal neuroprotection started.  Betamethasone series initiated.  IV labetalol/hydralazine protocol started.      Obstetric HPI:  Patient reports None contractions, active fetal movement, absent vaginal bleeding , absent loss of fluid   Patient reports headaches for the last few days, but none today.  Has been taking labetalol at home as prescribed.  Denies any visual disturbance, chest pain, SOB, RUQ, or epigastric pain.  Required early delivery around 28 weeks for severe preeclampsia her last pregnancy.     Objective:     Vital Signs (Most Recent):  Temp: 97.8 °F (36.6 °C) (08/14/20 1214)  Pulse: 62 (08/14/20 1534)  Resp: 20 (08/14/20 1214)  BP: (!) 185/107 (08/14/20 1515)  SpO2: 100 % (08/14/20 1534) Vital Signs (24h Range):  Temp:  [97.8 °F (36.6 °C)] 97.8 °F (36.6 °C)  Pulse:  [55-80] 62  Resp:  [20] 20  SpO2:  [97 %-100 %] 100 %  BP: (154-201)/() 185/107        There is no height or weight on file to calculate BMI.    FHT: Cat 1 (reassuring)  TOCO:  Q 0 minutes      Intake/Output Summary (Last 24 hours) at 8/14/2020 1537  Last data filed at 8/14/2020 1500  Gross per 24 hour   Intake --   Output 500 ml   Net -500 ml            Significant Labs:  Recent Lab Results       08/14/20  1254   08/14/20  1205        Albumin 3.0       Alkaline  Phosphatase 149       ALT 32       Anion Gap 11       AST 22       Baso # 0.05       Basophil% 0.4       BILIRUBIN TOTAL 0.2  Comment:  For infants and newborns, interpretation of results should be based  on gestational age, weight and in agreement with clinical  observations.  Premature Infant recommended reference ranges:  Up to 24 hours.............<8.0 mg/dL  Up to 48 hours............<12.0 mg/dL  3-5 days..................<15.0 mg/dL  6-29 days.................<15.0 mg/dL         BUN, Bld 11       Calcium 9.1       Chloride 106       CO2 20       Creatinine 0.7       Creatinine, Random Ur   48.4  Comment:  The random urine reference ranges provided were established   for 24 hour urine collections.  No reference ranges exist for  random urine specimens.  Correlate clinically.       Differential Method Automated       eGFR if  >60       eGFR if non  >60  Comment:  Calculation used to obtain the estimated glomerular filtration  rate (eGFR) is the CKD-EPI equation.          Eos # 0.2       Eosinophil% 2.0       Glucose 66       Gran # (ANC) 7.3       Gran% 65.7       Hematocrit 34.7       Hemoglobin 11.5       Immature Grans (Abs) 0.06  Comment:  Mild elevation in immature granulocytes is non specific and   can be seen in a variety of conditions including stress response,   acute inflammation, trauma and pregnancy. Correlation with other   laboratory and clinical findings is essential.         Immature Granulocytes 0.5       Lymph # 3.0       Lymph% 27.0       MCH 29.6       MCHC 33.1       MCV 89       Mono # 0.5       Mono% 4.4       MPV 11.2       nRBC 0       Platelets 246       Potassium 4.1       Prot/Creat Ratio, Ur   1.07     PROTEIN TOTAL 7.0       Protein, Urine Random   52  Comment:  The random urine reference ranges provided were established   for 24 hour urine collections.  No reference ranges exist for  random urine specimens.  Correlate clinically.       RBC 3.89        RDW 12.6       Sodium 137       WBC 11.18             Physical Exam:   Constitutional: She is oriented to person, place, and time. She appears well-developed and well-nourished. No distress.    HENT:   Head: Normocephalic and atraumatic.     Neck: Neck supple. No thyromegaly present.    Cardiovascular: Normal rate and regular rhythm.     Pulmonary/Chest: Effort normal.        Abdominal: Soft. She exhibits no distension and no mass. There is no abdominal tenderness. There is no rebound and no guarding.             Musculoskeletal: Edema (1+ BL LE edema) present.       Neurological: She is alert and oriented to person, place, and time. She displays abnormal reflex (DTR's 4+ bilaterally, very brisk).    Skin: No rash noted.    Psychiatric: She has a normal mood and affect. Her behavior is normal. Judgment and thought content normal.       Assessment/Plan:     30 y.o. female  at 27w6d for:    * Chronic hypertension with superimposed pre-eclampsia  Takes labetalol 100mg po BID  Admit to L&D  Magnesium sulfate therapy   BMZ series begun   IV Labetalol/hydralazine per protocol   Reviewed lab findings and diagnosis of superimposed preeclampsia with the patient via the language line.  Explained the need for inpatient management until delivery.  Deliver by 34wga or sooner if bp cannot be controlled, development of any other severe features of preeclampsia, or worsening fetal well-being.  All questions answered.    Breech presentation of fetus  20: ultrasound: EFW 965g (25th%), breech, normal AFV  Explained need for  for delivery if baby remains breech          Yuliya Post MD  Obstetrics  [facility] -

## 2020-08-14 NOTE — ASSESSMENT & PLAN NOTE
20: ultrasound: EFW 965g (25th%), breech, normal AFV  Explained need for  for delivery if baby remains breech

## 2020-08-14 NOTE — H&P
[facility] -   Obstetrics  History & Physical    Patient Name: Essie Bernal  MRN: [id number]  Admission Date: 2020  Primary Care Provider: Primary Doctor No    Subjective:     Principal Problem:Pre-existing essential htn comp pregnancy, second trimester    History of Present Illness:  Sent from office due to increased blood pressures, hx of pre-eclampsia with PTB, baby breech on today's ultrasound    Obstetric HPI:  Patient reports None contractions, active fetal movement, No vaginal bleeding , No loss of fluid     This pregnancy has been complicated by   Hx pre-eclampsia with PTB  Breech presentation of fetus on ultrasound today in clinic  CHTN, labetalol 100mg po BID    OB History    Para Term  AB Living   5 4 3 1 0 4   SAB TAB Ectopic Multiple Live Births   0 0 0 0 4      # Outcome Date GA Lbr Frank/2nd Weight Sex Delivery Anes PTL Lv   5 Current            4  08/29/15   0.907 kg (2 lb) M Vag-Spont  N AMRI      Birth Comments: Induced early secondary to Severe Pre-eclampsia      Complications: Preeclampsia   3 Term 13   3.26 kg (7 lb 3 oz) M Vag-Spont  N MARI   2 Term 11   3.175 kg (7 lb) M Vag-Spont  N MARI      Complications: HTN in pregnancy, chronic   1 Term 06   2.948 kg (6 lb 8 oz) M Vag-Spont  N MARI     Past Medical History:   Diagnosis Date    Hypertension      No past surgical history on file.    PTA Medications   Medication Sig    labetaloL (NORMODYNE) 100 MG tablet Take 1 tablet (100 mg total) by mouth every 12 (twelve) hours.    PRENATAL 28 mg iron- 800 mcg Tab TK 1 T PO  D       Review of patient's allergies indicates:  No Known Allergies     Family History     Problem Relation (Age of Onset)    Diabetes Mother, Sister    Hypertension Mother, Sister        Tobacco Use    Smoking status: Never Smoker    Smokeless tobacco: Never Used   Substance and Sexual Activity    Alcohol use: Never     Frequency: Never    Drug use: Never     Sexual activity: Yes     Partners: Male     Birth control/protection: None     Review of Systems   Eyes: Negative for visual disturbance.   Gastrointestinal: Negative for abdominal pain.   Genitourinary: Negative for vaginal bleeding and vaginal discharge.   Neurological: Negative for headaches.   All other systems reviewed and are negative.     Objective:     Vital Signs (Most Recent):    Vital Signs (24h Range):  BP: (160-190)/(110) 160/110        There is no height or weight on file to calculate BMI.    FHT: 145 bpm with moderate variability, no decelerations, Cat 1   TOCO:  None    Physical Exam:   Constitutional: She is oriented to person, place, and time. She appears well-developed and well-nourished.    HENT:   Head: Normocephalic.     Neck: Normal range of motion.    Cardiovascular: Normal rate and regular rhythm.     Pulmonary/Chest: Effort normal and breath sounds normal.        Abdominal: Soft. There is no abdominal tenderness.   Gravid             Musculoskeletal: Normal range of motion.       Neurological: She is alert and oriented to person, place, and time. She has normal reflexes.    Skin: Skin is warm and dry.   No edema noted    Psychiatric: She has a normal mood and affect. Her behavior is normal. Judgment and thought content normal.       Cervix:  Deferred     Significant Labs:  Lab Results   Component Value Date    GROUPTRH O POS 2020    HEPBSAG Negative 2020       I have personallly reviewed all pertinent lab results from the last 24 hours.    Assessment/Plan:     30 y.o. female  at 27w6d for:    * Pre-existing essential htn comp pregnancy, second trimester  Takes labetalol 100mg po BID  Admit to L&D  Magnesium sulfate therapy   BMZ series begun   IV Labetalol/hydralazine per protocol   POC reviewed with Dr. Yuliya Post    Breech presentation of fetus  On ultrasound in clinic today 2020        Dex Alcala CNM  Obstetrics  [facility] [place]

## 2020-08-14 NOTE — PROGRESS NOTES
US today: 965 g (25%), breech, anatomy complete  Pt reports no complaints today.  Had a headache several days ago, but this has since resolved.  Denies vision changes, abdominal pain, swelling or vaginal bleeding.  Adequate fetal movements.  /110, repeat 160/110  Pt took her Labetalol today and lives one hour away.   Pt with history of Severe pre-e requiring  IOL with prior pregnancy.  Recommend that pt report to L+D for evaluation.  Labor precautions, kick counts and Pre-e precautions.

## 2020-08-14 NOTE — ASSESSMENT & PLAN NOTE
Takes labetalol 100mg po BID  Admit to L&D  Magnesium sulfate therapy   BMZ series begun   IV Labetalol/hydralazine per protocol   POC reviewed with Dr. Yuliya Post

## 2020-08-14 NOTE — SUBJECTIVE & OBJECTIVE
Obstetric HPI:  Patient reports None contractions, active fetal movement, absent vaginal bleeding , absent loss of fluid   Patient reports headaches for the last few days, but none today.  Has been taking labetalol at home as prescribed.  Denies any visual disturbance, chest pain, SOB, RUQ, or epigastric pain.  Required early delivery around 28 weeks for severe preeclampsia her last pregnancy.     Objective:     Vital Signs (Most Recent):  Temp: 97.8 °F (36.6 °C) (08/14/20 1214)  Pulse: 62 (08/14/20 1534)  Resp: 20 (08/14/20 1214)  BP: (!) 185/107 (08/14/20 1515)  SpO2: 100 % (08/14/20 1534) Vital Signs (24h Range):  Temp:  [97.8 °F (36.6 °C)] 97.8 °F (36.6 °C)  Pulse:  [55-80] 62  Resp:  [20] 20  SpO2:  [97 %-100 %] 100 %  BP: (154-201)/() 185/107        There is no height or weight on file to calculate BMI.    FHT: Cat 1 (reassuring)  TOCO:  Q 0 minutes      Intake/Output Summary (Last 24 hours) at 8/14/2020 1537  Last data filed at 8/14/2020 1500  Gross per 24 hour   Intake --   Output 500 ml   Net -500 ml            Significant Labs:  Recent Lab Results       08/14/20  1254   08/14/20  1205        Albumin 3.0       Alkaline Phosphatase 149       ALT 32       Anion Gap 11       AST 22       Baso # 0.05       Basophil% 0.4       BILIRUBIN TOTAL 0.2  Comment:  For infants and newborns, interpretation of results should be based  on gestational age, weight and in agreement with clinical  observations.  Premature Infant recommended reference ranges:  Up to 24 hours.............<8.0 mg/dL  Up to 48 hours............<12.0 mg/dL  3-5 days..................<15.0 mg/dL  6-29 days.................<15.0 mg/dL         BUN, Bld 11       Calcium 9.1       Chloride 106       CO2 20       Creatinine 0.7       Creatinine, Random Ur   48.4  Comment:  The random urine reference ranges provided were established   for 24 hour urine collections.  No reference ranges exist for  random urine specimens.  Correlate clinically.        Differential Method Automated       eGFR if  >60       eGFR if non  >60  Comment:  Calculation used to obtain the estimated glomerular filtration  rate (eGFR) is the CKD-EPI equation.          Eos # 0.2       Eosinophil% 2.0       Glucose 66       Gran # (ANC) 7.3       Gran% 65.7       Hematocrit 34.7       Hemoglobin 11.5       Immature Grans (Abs) 0.06  Comment:  Mild elevation in immature granulocytes is non specific and   can be seen in a variety of conditions including stress response,   acute inflammation, trauma and pregnancy. Correlation with other   laboratory and clinical findings is essential.         Immature Granulocytes 0.5       Lymph # 3.0       Lymph% 27.0       MCH 29.6       MCHC 33.1       MCV 89       Mono # 0.5       Mono% 4.4       MPV 11.2       nRBC 0       Platelets 246       Potassium 4.1       Prot/Creat Ratio, Ur   1.07     PROTEIN TOTAL 7.0       Protein, Urine Random   52  Comment:  The random urine reference ranges provided were established   for 24 hour urine collections.  No reference ranges exist for  random urine specimens.  Correlate clinically.       RBC 3.89       RDW 12.6       Sodium 137       WBC 11.18             Physical Exam:   Constitutional: She is oriented to person, place, and time. She appears well-developed and well-nourished. No distress.    HENT:   Head: Normocephalic and atraumatic.     Neck: Neck supple. No thyromegaly present.    Cardiovascular: Normal rate and regular rhythm.     Pulmonary/Chest: Effort normal.        Abdominal: Soft. She exhibits no distension and no mass. There is no abdominal tenderness. There is no rebound and no guarding.             Musculoskeletal: Edema (1+ BL LE edema) present.       Neurological: She is alert and oriented to person, place, and time. She displays abnormal reflex (DTR's 4+ bilaterally, very brisk).    Skin: No rash noted.    Psychiatric: She has a normal mood and affect. Her behavior is  normal. Judgment and thought content normal.

## 2020-08-15 PROCEDURE — 99231 SBSQ HOSP IP/OBS SF/LOW 25: CPT | Mod: ,,, | Performed by: OBSTETRICS & GYNECOLOGY

## 2020-08-15 PROCEDURE — 25000003 PHARM REV CODE 250: Performed by: MIDWIFE

## 2020-08-15 PROCEDURE — 11000001 HC ACUTE MED/SURG PRIVATE ROOM

## 2020-08-15 PROCEDURE — 25000003 PHARM REV CODE 250: Performed by: OBSTETRICS & GYNECOLOGY

## 2020-08-15 PROCEDURE — 59025 FETAL NON-STRESS TEST: CPT

## 2020-08-15 PROCEDURE — 99231 PR SUBSEQUENT HOSPITAL CARE,LEVL I: ICD-10-PCS | Mod: ,,, | Performed by: OBSTETRICS & GYNECOLOGY

## 2020-08-15 PROCEDURE — 72100003 HC LABOR CARE, EA. ADDL. 8 HRS

## 2020-08-15 PROCEDURE — 63600175 PHARM REV CODE 636 W HCPCS: Performed by: ADVANCED PRACTICE MIDWIFE

## 2020-08-15 RX ADMIN — LABETALOL HYDROCHLORIDE 400 MG: 200 TABLET, FILM COATED ORAL at 08:08

## 2020-08-15 RX ADMIN — BUTALBITAL, ACETAMINOPHEN AND CAFFEINE 2 TABLET: 50; 325; 40 TABLET ORAL at 01:08

## 2020-08-15 RX ADMIN — BETAMETHASONE SODIUM PHOSPHATE AND BETAMETHASONE ACETATE 12 MG: 3; 3 INJECTION, SUSPENSION INTRA-ARTICULAR; INTRALESIONAL; INTRAMUSCULAR at 01:08

## 2020-08-15 RX ADMIN — LABETALOL HYDROCHLORIDE 400 MG: 200 TABLET, FILM COATED ORAL at 02:08

## 2020-08-15 RX ADMIN — MAGNESIUM SULFATE HEPTAHYDRATE 2 G/HR: 40 INJECTION, SOLUTION INTRAVENOUS at 05:08

## 2020-08-15 RX ADMIN — LABETALOL HYDROCHLORIDE 400 MG: 200 TABLET, FILM COATED ORAL at 09:08

## 2020-08-15 NOTE — SUBJECTIVE & OBJECTIVE
Obstetric HPI:  Patient reports None contractions, active fetal movement, absent vaginal bleeding , absent loss of fluid   No headache.  Just feels tired with some blurry vision, but no scotoma.  No chest pain or SOB.  No RUQ or epigastric pain.     Objective:     Vital Signs (Most Recent):  Temp: 98.4 °F (36.9 °C) (08/15/20 0600)  Pulse: 73 (08/15/20 1200)  Resp: 16 (08/15/20 1200)  BP: 125/76 (08/15/20 1200)  SpO2: 98 % (08/15/20 1200) Vital Signs (24h Range):  Temp:  [97.9 °F (36.6 °C)-98.4 °F (36.9 °C)] 98.4 °F (36.9 °C)  Pulse:  [55-84] 73  Resp:  [16-20] 16  SpO2:  [95 %-100 %] 98 %  BP: (123-201)/() 125/76        There is no height or weight on file to calculate BMI.    FHT: Cat 1 (reassuring)  TOCO:  Q 0 minutes      Intake/Output Summary (Last 24 hours) at 8/15/2020 1218  Last data filed at 8/15/2020 1200  Gross per 24 hour   Intake 2856 ml   Output 2909 ml   Net -53 ml            Significant Labs:  Recent Lab Results       08/14/20  1545   08/14/20  1254        Albumin   3.0     Alkaline Phosphatase   149     ALT   32     Anion Gap   11     AST   22     Baso #   0.05     Basophil%   0.4     BILIRUBIN TOTAL   0.2  Comment:  For infants and newborns, interpretation of results should be based  on gestational age, weight and in agreement with clinical  observations.  Premature Infant recommended reference ranges:  Up to 24 hours.............<8.0 mg/dL  Up to 48 hours............<12.0 mg/dL  3-5 days..................<15.0 mg/dL  6-29 days.................<15.0 mg/dL       BUN, Bld   11     Calcium   9.1     Chloride   106     CO2   20     Creatinine   0.7     Differential Method   Automated     eGFR if    >60     eGFR if non    >60  Comment:  Calculation used to obtain the estimated glomerular filtration  rate (eGFR) is the CKD-EPI equation.        Eos #   0.2     Eosinophil%   2.0     Glucose   66     Gran # (ANC)   7.3     Gran%   65.7     Hematocrit   34.7      Hemoglobin   11.5     Immature Grans (Abs)   0.06  Comment:  Mild elevation in immature granulocytes is non specific and   can be seen in a variety of conditions including stress response,   acute inflammation, trauma and pregnancy. Correlation with other   laboratory and clinical findings is essential.       Immature Granulocytes   0.5     Lymph #   3.0     Lymph%   27.0     MCH   29.6     MCHC   33.1     MCV   89     Mono #   0.5     Mono%   4.4     MPV   11.2     nRBC   0     Platelets   246     Potassium   4.1     PROTEIN TOTAL   7.0     RBC   3.89     RDW   12.6     SARS-CoV-2 RNA, Amplification, Qual Negative  Comment:  This test utilizes isothermal nucleic acid amplification   technology to detect the SARS-CoV-2 RdRp nucleic acid segment.   The analytical sensitivity (limit of detection) is 125 genome   equivalents/mL.   A POSITIVE result implies infection with the SARS-CoV-2 virus;  the patient is presumed to be contagious.    A NEGATIVE result means that SARS-CoV-2 nucleic acids are not  present above the limit of detection. A NEGATIVE result should be   treated as presumptive. It does not rule out the possibility of   COVID-19 and should not be the sole basis for treatment decisions.   If COVID-19 is strongly suspected based on clinical and exposure   history, re-testing using an alternate molecular assay should be   considered.   This test is only for use under the Food and Drug   Administration s Emergency Use Authorization (EUA).   Commercial kits are provided by [employer].   Performance characteristics of the EUA have been independently  verified by [facility] Department of  Pathology and Laboratory Medicine.   _________________________________________________________________  The ID NOW COVID-19 Letter of Authorization, along with the   authorized Fact Sheet for Healthcare Providers, the authorized Fact  Sheet for Patients, and authorized labeling are available on the FDA    website:  www.fda.gov/MedicalDevices/Safety/EmergencySituations/viy939961.htm         Sodium   137     WBC   11.18           Physical Exam:   Constitutional: She is oriented to person, place, and time. She appears well-developed and well-nourished. No distress.    HENT:   Head: Normocephalic and atraumatic.     Neck: Neck supple. No thyromegaly present.    Cardiovascular: Normal rate and regular rhythm.  Exam reveals no clubbing, no cyanosis and no edema.     Pulmonary/Chest: Effort normal.        Abdominal: Soft. She exhibits no distension and no mass. There is no abdominal tenderness. There is no rebound and no guarding.             Musculoskeletal: No edema.       Neurological: She is alert and oriented to person, place, and time. She displays abnormal reflex (3+ bilaterally).    Skin: No rash noted. No cyanosis. Nails show no clubbing.    Psychiatric: She has a normal mood and affect. Her behavior is normal. Judgment and thought content normal.

## 2020-08-15 NOTE — ASSESSMENT & PLAN NOTE
Takes labetalol 100mg po BID  Admit to L&D  Magnesium sulfate therapy   BMZ series begun   IV Labetalol/hydralazine per protocol   Reviewed lab findings and diagnosis of superimposed preeclampsia with the patient via the language line.  Explained the need for inpatient management until delivery.  Deliver by 34wga or sooner if bp cannot be controlled, development of any other severe features of preeclampsia, or worsening fetal well-being.  All questions answered.    8/15/20-Pt required IV labetalol and hydralazine yesterday for blood pressure control, but bp remained stable overnight on labetalol 400mg tid.  Pt to complete 24 hours IV magnesium sulfate and BMZ series this afternoon.  Will remain inpatient for close maternal fetal surveillance afterward.  Deliver by 34wga or sooner for worsening maternal or fetal status

## 2020-08-15 NOTE — PROGRESS NOTES
[facility] -   Obstetrics  Antepartum Progress Note    Patient Name: Essie Bernal  MRN: [id number]  Admission Date: 8/14/2020  Hospital Length of Stay: 1 days  Attending Physician: Yuliya Post MD  Primary Care Provider: Primary Doctor No    Subjective:     Principal Problem:Chronic hypertension with superimposed pre-eclampsia    HPI:  Sent from office due to increased blood pressures, hx of pre-eclampsia with PTB, baby breech on today's ultrasound    Hospital Course:  Upon admission to L&D, bp in the severely elevated range, patient hyperreflexic, and labs significant for urine P/C ratio 1.07.    IV magnesium sulfate for seizure prophylaxis and fetal neuroprotection started.  Betamethasone series initiated.  IV labetalol/hydralazine protocol started.  8/15- BP stable on labetalol 400mg tid.    Obstetric HPI:  Patient reports None contractions, active fetal movement, absent vaginal bleeding , absent loss of fluid   No headache.  Just feels tired with some blurry vision, but no scotoma.  No chest pain or SOB.  No RUQ or epigastric pain.     Objective:     Vital Signs (Most Recent):  Temp: 98.4 °F (36.9 °C) (08/15/20 0600)  Pulse: 73 (08/15/20 1200)  Resp: 16 (08/15/20 1200)  BP: 125/76 (08/15/20 1200)  SpO2: 98 % (08/15/20 1200) Vital Signs (24h Range):  Temp:  [97.9 °F (36.6 °C)-98.4 °F (36.9 °C)] 98.4 °F (36.9 °C)  Pulse:  [55-84] 73  Resp:  [16-20] 16  SpO2:  [95 %-100 %] 98 %  BP: (123-201)/() 125/76        There is no height or weight on file to calculate BMI.    FHT: Cat 1 (reassuring)  TOCO:  Q 0 minutes      Intake/Output Summary (Last 24 hours) at 8/15/2020 1218  Last data filed at 8/15/2020 1200  Gross per 24 hour   Intake 2856 ml   Output 2909 ml   Net -53 ml            Significant Labs:  Recent Lab Results       08/14/20  1545   08/14/20  1254        Albumin   3.0     Alkaline Phosphatase   149     ALT   32     Anion Gap   11     AST   22     Baso #   0.05     Basophil%    0.4     BILIRUBIN TOTAL   0.2  Comment:  For infants and newborns, interpretation of results should be based  on gestational age, weight and in agreement with clinical  observations.  Premature Infant recommended reference ranges:  Up to 24 hours.............<8.0 mg/dL  Up to 48 hours............<12.0 mg/dL  3-5 days..................<15.0 mg/dL  6-29 days.................<15.0 mg/dL       BUN, Bld   11     Calcium   9.1     Chloride   106     CO2   20     Creatinine   0.7     Differential Method   Automated     eGFR if    >60     eGFR if non    >60  Comment:  Calculation used to obtain the estimated glomerular filtration  rate (eGFR) is the CKD-EPI equation.        Eos #   0.2     Eosinophil%   2.0     Glucose   66     Gran # (ANC)   7.3     Gran%   65.7     Hematocrit   34.7     Hemoglobin   11.5     Immature Grans (Abs)   0.06  Comment:  Mild elevation in immature granulocytes is non specific and   can be seen in a variety of conditions including stress response,   acute inflammation, trauma and pregnancy. Correlation with other   laboratory and clinical findings is essential.       Immature Granulocytes   0.5     Lymph #   3.0     Lymph%   27.0     MCH   29.6     MCHC   33.1     MCV   89     Mono #   0.5     Mono%   4.4     MPV   11.2     nRBC   0     Platelets   246     Potassium   4.1     PROTEIN TOTAL   7.0     RBC   3.89     RDW   12.6     SARS-CoV-2 RNA, Amplification, Qual Negative  Comment:  This test utilizes isothermal nucleic acid amplification   technology to detect the SARS-CoV-2 RdRp nucleic acid segment.   The analytical sensitivity (limit of detection) is 125 genome   equivalents/mL.   A POSITIVE result implies infection with the SARS-CoV-2 virus;  the patient is presumed to be contagious.    A NEGATIVE result means that SARS-CoV-2 nucleic acids are not  present above the limit of detection. A NEGATIVE result should be   treated as presumptive. It does not rule out  the possibility of   COVID-19 and should not be the sole basis for treatment decisions.   If COVID-19 is strongly suspected based on clinical and exposure   history, re-testing using an alternate molecular assay should be   considered.   This test is only for use under the Food and Drug   Administration s Emergency Use Authorization (EUA).   Commercial kits are provided by [employer].   Performance characteristics of the EUA have been independently  verified by [facility] Department of  Pathology and Laboratory Medicine.   _________________________________________________________________  The ID NOW COVID-19 Letter of Authorization, along with the   authorized Fact Sheet for Healthcare Providers, the authorized Fact  Sheet for Patients, and authorized labeling are available on the FDA   website:  www.fda.gov/MedicalDevices/Safety/EmergencySituations/uez475617.htm         Sodium   137     WBC   11.18           Physical Exam:   Constitutional: She is oriented to person, place, and time. She appears well-developed and well-nourished. No distress.    HENT:   Head: Normocephalic and atraumatic.     Neck: Neck supple. No thyromegaly present.    Cardiovascular: Normal rate and regular rhythm.  Exam reveals no clubbing, no cyanosis and no edema.     Pulmonary/Chest: Effort normal.        Abdominal: Soft. She exhibits no distension and no mass. There is no abdominal tenderness. There is no rebound and no guarding.             Musculoskeletal: No edema.       Neurological: She is alert and oriented to person, place, and time. She displays abnormal reflex (3+ bilaterally).    Skin: No rash noted. No cyanosis. Nails show no clubbing.    Psychiatric: She has a normal mood and affect. Her behavior is normal. Judgment and thought content normal.       Assessment/Plan:     30 y.o. female  at 28w0d for:    * Chronic hypertension with superimposed pre-eclampsia  Takes labetalol 100mg po BID  Admit to  L&D  Magnesium sulfate therapy   BMZ series begun   IV Labetalol/hydralazine per protocol   Reviewed lab findings and diagnosis of superimposed preeclampsia with the patient via the language line.  Explained the need for inpatient management until delivery.  Deliver by 34wga or sooner if bp cannot be controlled, development of any other severe features of preeclampsia, or worsening fetal well-being.  All questions answered.    8/15/20-Pt required IV labetalol and hydralazine yesterday for blood pressure control, but bp remained stable overnight on labetalol 400mg tid.  Pt to complete 24 hours IV magnesium sulfate and BMZ series this afternoon.  Will remain inpatient for close maternal fetal surveillance afterward.  Deliver by 34wga or sooner for worsening maternal or fetal status    Breech presentation of fetus  20: ultrasound: EFW 965g (25th%), breech, normal AFV  Explained need for  for delivery if baby remains breech          Yuliya Post MD  Obstetrics  [facility] -

## 2020-08-16 ENCOUNTER — ANESTHESIA (OUTPATIENT)
Dept: OBSTETRICS AND GYNECOLOGY | Facility: HOSPITAL | Age: 30
End: 2020-08-16
Payer: MEDICAID

## 2020-08-16 ENCOUNTER — ANESTHESIA EVENT (OUTPATIENT)
Dept: OBSTETRICS AND GYNECOLOGY | Facility: HOSPITAL | Age: 30
End: 2020-08-16
Payer: MEDICAID

## 2020-08-16 LAB
BASOPHILS # BLD AUTO: 0.03 K/UL (ref 0–0.2)
BASOPHILS NFR BLD: 0.2 % (ref 0–1.9)
DIFFERENTIAL METHOD: ABNORMAL
EOSINOPHIL # BLD AUTO: 0.1 K/UL (ref 0–0.5)
EOSINOPHIL NFR BLD: 0.6 % (ref 0–8)
ERYTHROCYTE [DISTWIDTH] IN BLOOD BY AUTOMATED COUNT: 13.1 % (ref 11.5–14.5)
HCT VFR BLD AUTO: 35.5 % (ref 37–48.5)
HGB BLD-MCNC: 11.2 G/DL (ref 12–16)
IMM GRANULOCYTES # BLD AUTO: 0.26 K/UL (ref 0–0.04)
IMM GRANULOCYTES NFR BLD AUTO: 1.9 % (ref 0–0.5)
LYMPHOCYTES # BLD AUTO: 3.8 K/UL (ref 1–4.8)
LYMPHOCYTES NFR BLD: 26.9 % (ref 18–48)
MCH RBC QN AUTO: 29.2 PG (ref 27–31)
MCHC RBC AUTO-ENTMCNC: 31.5 G/DL (ref 32–36)
MCV RBC AUTO: 93 FL (ref 82–98)
MONOCYTES # BLD AUTO: 0.8 K/UL (ref 0.3–1)
MONOCYTES NFR BLD: 5.8 % (ref 4–15)
NEUTROPHILS # BLD AUTO: 9.1 K/UL (ref 1.8–7.7)
NEUTROPHILS NFR BLD: 64.6 % (ref 38–73)
NRBC BLD-RTO: 0 /100 WBC
PLATELET # BLD AUTO: 252 K/UL (ref 150–350)
PMV BLD AUTO: 11 FL (ref 9.2–12.9)
RBC # BLD AUTO: 3.83 M/UL (ref 4–5.4)
WBC # BLD AUTO: 14 K/UL (ref 3.9–12.7)

## 2020-08-16 PROCEDURE — 99232 SBSQ HOSP IP/OBS MODERATE 35: CPT | Mod: ,,, | Performed by: OBSTETRICS & GYNECOLOGY

## 2020-08-16 PROCEDURE — 88307 TISSUE EXAM BY PATHOLOGIST: CPT | Mod: 26,,, | Performed by: PATHOLOGY

## 2020-08-16 PROCEDURE — 85730 THROMBOPLASTIN TIME PARTIAL: CPT

## 2020-08-16 PROCEDURE — 85025 COMPLETE CBC W/AUTO DIFF WBC: CPT

## 2020-08-16 PROCEDURE — 59514 CESAREAN DELIVERY ONLY: CPT | Mod: AT,,, | Performed by: OBSTETRICS & GYNECOLOGY

## 2020-08-16 PROCEDURE — 36000684 HC CESAREAN SECTION, UNSCHEDULED

## 2020-08-16 PROCEDURE — 63600175 PHARM REV CODE 636 W HCPCS: Performed by: NURSE ANESTHETIST, CERTIFIED REGISTERED

## 2020-08-16 PROCEDURE — 37000008 HC ANESTHESIA 1ST 15 MINUTES: Performed by: OBSTETRICS & GYNECOLOGY

## 2020-08-16 PROCEDURE — 51702 INSERT TEMP BLADDER CATH: CPT

## 2020-08-16 PROCEDURE — 85384 FIBRINOGEN ACTIVITY: CPT

## 2020-08-16 PROCEDURE — 59025 FETAL NON-STRESS TEST: CPT

## 2020-08-16 PROCEDURE — 37000009 HC ANESTHESIA EA ADD 15 MINS: Performed by: OBSTETRICS & GYNECOLOGY

## 2020-08-16 PROCEDURE — 88307 PR  SURG PATH,LEVEL V: ICD-10-PCS | Mod: 26,,, | Performed by: PATHOLOGY

## 2020-08-16 PROCEDURE — 25000003 PHARM REV CODE 250: Performed by: NURSE ANESTHETIST, CERTIFIED REGISTERED

## 2020-08-16 PROCEDURE — 59514 PR CESAREAN DELIVERY ONLY: ICD-10-PCS | Mod: AT,,, | Performed by: OBSTETRICS & GYNECOLOGY

## 2020-08-16 PROCEDURE — 11000001 HC ACUTE MED/SURG PRIVATE ROOM

## 2020-08-16 PROCEDURE — 25000003 PHARM REV CODE 250: Performed by: OBSTETRICS & GYNECOLOGY

## 2020-08-16 PROCEDURE — 85610 PROTHROMBIN TIME: CPT

## 2020-08-16 PROCEDURE — 86850 RBC ANTIBODY SCREEN: CPT

## 2020-08-16 PROCEDURE — 88307 TISSUE EXAM BY PATHOLOGIST: CPT | Performed by: PATHOLOGY

## 2020-08-16 PROCEDURE — 63600175 PHARM REV CODE 636 W HCPCS: Performed by: MIDWIFE

## 2020-08-16 PROCEDURE — 99232 PR SUBSEQUENT HOSPITAL CARE,LEVL II: ICD-10-PCS | Mod: ,,, | Performed by: OBSTETRICS & GYNECOLOGY

## 2020-08-16 PROCEDURE — 36415 COLL VENOUS BLD VENIPUNCTURE: CPT

## 2020-08-16 PROCEDURE — 80053 COMPREHEN METABOLIC PANEL: CPT

## 2020-08-16 RX ORDER — OXYTOCIN/RINGER'S LACTATE 30/500 ML
95 PLASTIC BAG, INJECTION (ML) INTRAVENOUS ONCE
Status: DISCONTINUED | OUTPATIENT
Start: 2020-08-16 | End: 2020-08-17

## 2020-08-16 RX ORDER — ONDANSETRON 2 MG/ML
INJECTION INTRAMUSCULAR; INTRAVENOUS
Status: DISCONTINUED | OUTPATIENT
Start: 2020-08-16 | End: 2020-08-17

## 2020-08-16 RX ORDER — NIFEDIPINE 30 MG/1
30 TABLET, EXTENDED RELEASE ORAL DAILY
Status: DISCONTINUED | OUTPATIENT
Start: 2020-08-16 | End: 2020-08-16

## 2020-08-16 RX ORDER — CALCIUM GLUCONATE 98 MG/ML
1 INJECTION, SOLUTION INTRAVENOUS
Status: DISCONTINUED | OUTPATIENT
Start: 2020-08-16 | End: 2020-08-17

## 2020-08-16 RX ORDER — LIDOCAINE HYDROCHLORIDE 10 MG/ML
INJECTION, SOLUTION EPIDURAL; INFILTRATION; INTRACAUDAL; PERINEURAL
Status: DISCONTINUED | OUTPATIENT
Start: 2020-08-16 | End: 2020-08-17

## 2020-08-16 RX ORDER — SUCCINYLCHOLINE CHLORIDE 20 MG/ML
INJECTION INTRAMUSCULAR; INTRAVENOUS
Status: DISCONTINUED | OUTPATIENT
Start: 2020-08-16 | End: 2020-08-17

## 2020-08-16 RX ORDER — CARBOPROST TROMETHAMINE 250 UG/ML
250 INJECTION, SOLUTION INTRAMUSCULAR
Status: DISCONTINUED | OUTPATIENT
Start: 2020-08-16 | End: 2020-08-17

## 2020-08-16 RX ORDER — SODIUM CHLORIDE, SODIUM LACTATE, POTASSIUM CHLORIDE, CALCIUM CHLORIDE 600; 310; 30; 20 MG/100ML; MG/100ML; MG/100ML; MG/100ML
INJECTION, SOLUTION INTRAVENOUS CONTINUOUS PRN
Status: DISCONTINUED | OUTPATIENT
Start: 2020-08-16 | End: 2020-08-17

## 2020-08-16 RX ORDER — MAGNESIUM SULFATE HEPTAHYDRATE 40 MG/ML
2 INJECTION, SOLUTION INTRAVENOUS CONTINUOUS
Status: DISCONTINUED | OUTPATIENT
Start: 2020-08-17 | End: 2020-08-21 | Stop reason: HOSPADM

## 2020-08-16 RX ORDER — ROCURONIUM BROMIDE 10 MG/ML
INJECTION, SOLUTION INTRAVENOUS
Status: DISCONTINUED | OUTPATIENT
Start: 2020-08-16 | End: 2020-08-17

## 2020-08-16 RX ORDER — MISOPROSTOL 200 UG/1
800 TABLET ORAL
Status: DISCONTINUED | OUTPATIENT
Start: 2020-08-16 | End: 2020-08-17

## 2020-08-16 RX ORDER — CEFAZOLIN SODIUM 2 G/50ML
2 SOLUTION INTRAVENOUS ONCE AS NEEDED
Status: COMPLETED | OUTPATIENT
Start: 2020-08-17 | End: 2020-08-16

## 2020-08-16 RX ORDER — SODIUM CHLORIDE, SODIUM LACTATE, POTASSIUM CHLORIDE, CALCIUM CHLORIDE 600; 310; 30; 20 MG/100ML; MG/100ML; MG/100ML; MG/100ML
INJECTION, SOLUTION INTRAVENOUS CONTINUOUS
Status: DISCONTINUED | OUTPATIENT
Start: 2020-08-17 | End: 2020-08-17

## 2020-08-16 RX ORDER — PROPOFOL 10 MG/ML
VIAL (ML) INTRAVENOUS
Status: DISCONTINUED | OUTPATIENT
Start: 2020-08-16 | End: 2020-08-17

## 2020-08-16 RX ORDER — OXYTOCIN/RINGER'S LACTATE 30/500 ML
334 PLASTIC BAG, INJECTION (ML) INTRAVENOUS ONCE
Status: COMPLETED | OUTPATIENT
Start: 2020-08-17 | End: 2020-08-17

## 2020-08-16 RX ORDER — SODIUM CITRATE AND CITRIC ACID MONOHYDRATE 334; 500 MG/5ML; MG/5ML
30 SOLUTION ORAL
Status: DISCONTINUED | OUTPATIENT
Start: 2020-08-16 | End: 2020-08-17

## 2020-08-16 RX ORDER — MORPHINE SULFATE 1 MG/ML
INJECTION, SOLUTION EPIDURAL; INTRATHECAL; INTRAVENOUS
Status: DISCONTINUED | OUTPATIENT
Start: 2020-08-16 | End: 2020-08-17

## 2020-08-16 RX ORDER — FAMOTIDINE 10 MG/ML
20 INJECTION INTRAVENOUS
Status: DISCONTINUED | OUTPATIENT
Start: 2020-08-16 | End: 2020-08-17

## 2020-08-16 RX ORDER — MAGNESIUM SULFATE HEPTAHYDRATE 40 MG/ML
4 INJECTION, SOLUTION INTRAVENOUS ONCE
Status: COMPLETED | OUTPATIENT
Start: 2020-08-17 | End: 2020-08-16

## 2020-08-16 RX ORDER — NIFEDIPINE 30 MG/1
30 TABLET, EXTENDED RELEASE ORAL 2 TIMES DAILY
Status: DISCONTINUED | OUTPATIENT
Start: 2020-08-16 | End: 2020-08-20

## 2020-08-16 RX ADMIN — PROPOFOL 170 MG: 10 INJECTION, EMULSION INTRAVENOUS at 11:08

## 2020-08-16 RX ADMIN — SODIUM CHLORIDE, SODIUM LACTATE, POTASSIUM CHLORIDE, AND CALCIUM CHLORIDE: .6; .31; .03; .02 INJECTION, SOLUTION INTRAVENOUS at 11:08

## 2020-08-16 RX ADMIN — ROCURONIUM BROMIDE 10 MG: 10 INJECTION, SOLUTION INTRAVENOUS at 11:08

## 2020-08-16 RX ADMIN — LABETALOL HYDROCHLORIDE 400 MG: 200 TABLET, FILM COATED ORAL at 03:08

## 2020-08-16 RX ADMIN — LIDOCAINE HYDROCHLORIDE 50 MG: 10 INJECTION, SOLUTION EPIDURAL; INFILTRATION; INTRACAUDAL; PERINEURAL at 11:08

## 2020-08-16 RX ADMIN — LABETALOL HYDROCHLORIDE 400 MG: 200 TABLET, FILM COATED ORAL at 08:08

## 2020-08-16 RX ADMIN — MAGNESIUM SULFATE HEPTAHYDRATE 4 G: 40 INJECTION, SOLUTION INTRAVENOUS at 11:08

## 2020-08-16 RX ADMIN — MORPHINE SULFATE 5 MG: 1 INJECTION, SOLUTION EPIDURAL; INTRATHECAL; INTRAVENOUS at 11:08

## 2020-08-16 RX ADMIN — MAGNESIUM SULFATE HEPTAHYDRATE 2 G/HR: 40 INJECTION, SOLUTION INTRAVENOUS at 11:08

## 2020-08-16 RX ADMIN — SODIUM CHLORIDE, SODIUM LACTATE, POTASSIUM CHLORIDE, AND CALCIUM CHLORIDE: 600; 310; 30; 20 INJECTION, SOLUTION INTRAVENOUS at 11:08

## 2020-08-16 RX ADMIN — Medication 1000 ML/HR: at 11:08

## 2020-08-16 RX ADMIN — NIFEDIPINE 30 MG: 30 TABLET, FILM COATED, EXTENDED RELEASE ORAL at 04:08

## 2020-08-16 RX ADMIN — CEFAZOLIN SODIUM 2 G: 2 SOLUTION INTRAVENOUS at 11:08

## 2020-08-16 RX ADMIN — SUCCINYLCHOLINE CHLORIDE 120 MG: 20 INJECTION, SOLUTION INTRAMUSCULAR; INTRAVENOUS at 11:08

## 2020-08-16 RX ADMIN — ONDANSETRON 8 MG: 2 INJECTION, SOLUTION INTRAMUSCULAR; INTRAVENOUS at 11:08

## 2020-08-16 RX ADMIN — NIFEDIPINE 30 MG: 30 TABLET, FILM COATED, EXTENDED RELEASE ORAL at 05:08

## 2020-08-16 NOTE — PROGRESS NOTES
[facility] -   Obstetrics  Antepartum Progress Note    Patient Name: Essie Bernal  MRN: [id number]  Admission Date: 8/14/2020  Hospital Length of Stay: 2 days  Attending Physician: Yuliya Post MD  Primary Care Provider: Primary Doctor No    Subjective:     Principal Problem:Chronic hypertension with superimposed pre-eclampsia    HPI:  Sent from office due to increased blood pressures, hx of pre-eclampsia with PTB, baby breech on today's ultrasound    Hospital Course:  Upon admission to L&D, bp in the severely elevated range, patient hyperreflexic, and labs significant for urine P/C ratio 1.07.    IV magnesium sulfate for seizure prophylaxis and fetal neuroprotection started.  Betamethasone series initiated.  IV labetalol/hydralazine protocol started.  8/15- BP stable on labetalol 400mg tid.    Obstetric HPI:  Patient reports None contractions, active fetal movement, absent vaginal bleeding , absent loss of fluid   Pt reports no headaches, vision changes, abdominal pain.  Feels better this morning.     Objective:     Vital Signs (Most Recent):  Temp: 98.3 °F (36.8 °C) (08/16/20 0440)  Pulse: 77 (08/16/20 0600)  Resp: 16 (08/16/20 0440)  BP: (!) 143/84 (08/16/20 0600)  SpO2: 97 % (08/15/20 1719) Vital Signs (24h Range):  Temp:  [98.3 °F (36.8 °C)] 98.3 °F (36.8 °C)  Pulse:  [57-81] 77  Resp:  [16-20] 16  SpO2:  [95 %-99 %] 97 %  BP: (123-179)/(65-91) 143/84        There is no height or weight on file to calculate BMI.    FHT: Cat 1 (reassuring)  TOCO:        Intake/Output Summary (Last 24 hours) at 8/16/2020 0723  Last data filed at 8/15/2020 1557  Gross per 24 hour   Intake 849.17 ml   Output 1650 ml   Net -800.83 ml       Significant Labs:  Recent Lab Results     None          Physical Exam:   Constitutional: She is oriented to person, place, and time. She appears well-developed and well-nourished. No distress.       Cardiovascular: Normal rate, regular rhythm and normal heart sounds.      Pulmonary/Chest: Effort normal and breath sounds normal.        Abdominal: Soft. Bowel sounds are normal. She exhibits no distension. There is no abdominal tenderness.             Musculoskeletal: Normal range of motion and moves all extremeties. No tenderness or edema.       Neurological: She is alert and oriented to person, place, and time. She displays abnormal reflex (hyperreflexia).    Skin: Skin is warm and dry.    Psychiatric: She has a normal mood and affect. Her behavior is normal. Thought content normal.       Assessment/Plan:     30 y.o. female  at 28w1d for:    * Chronic hypertension with superimposed pre-eclampsia  Takes labetalol 100mg po BID  Admit to L&D  Magnesium sulfate therapy   BMZ series begun   IV Labetalol/hydralazine per protocol   Reviewed lab findings and diagnosis of superimposed preeclampsia with the patient via the language line.  Explained the need for inpatient management until delivery.  Deliver by 34wga or sooner if bp cannot be controlled, development of any other severe features of preeclampsia, or worsening fetal well-being.  All questions answered.    8/15/20-Pt required IV labetalol and hydralazine yesterday for blood pressure control, but bp remained stable overnight on labetalol 400mg tid.  Pt to complete 24 hours IV magnesium sulfate and BMZ series this afternoon.  Will remain inpatient for close maternal fetal surveillance afterward.  Deliver by 34wga or sooner for worsening maternal or fetal status    20 - BP was stable overnight but increased to severe range this AM.  Improved after addition of Procardia 30 daily.  Continue with Labetalol 400 TID.  Pt again advised on treatment plan and goals.  Will consider proceeding with delivery prior to 34 WGA if BP remains difficult to control or worsens.    Breech presentation of fetus  20: ultrasound: EFW 965g (25th%), breech, normal AFV  Explained need for  for delivery if baby remains  senait Puente MD  Obstetrics  [facility] -

## 2020-08-16 NOTE — PLAN OF CARE
Resting well in bed. NST done this morning for the ordered 20 minutes and charted on. Will continue to monitor.

## 2020-08-16 NOTE — NURSING
/91, morning scheduled labetolol given and Dr Puente notified. Dr Puente wants recheck in 20-30 minutes.     0830 recheck 167/83, Dr Puente notified. No new orders

## 2020-08-16 NOTE — SUBJECTIVE & OBJECTIVE
Obstetric HPI:  Patient reports None contractions, active fetal movement, absent vaginal bleeding , absent loss of fluid   Pt reports no headaches, vision changes, abdominal pain.  Feels better this morning.     Objective:     Vital Signs (Most Recent):  Temp: 98.3 °F (36.8 °C) (08/16/20 0440)  Pulse: 77 (08/16/20 0600)  Resp: 16 (08/16/20 0440)  BP: (!) 143/84 (08/16/20 0600)  SpO2: 97 % (08/15/20 1719) Vital Signs (24h Range):  Temp:  [98.3 °F (36.8 °C)] 98.3 °F (36.8 °C)  Pulse:  [57-81] 77  Resp:  [16-20] 16  SpO2:  [95 %-99 %] 97 %  BP: (123-179)/(65-91) 143/84        There is no height or weight on file to calculate BMI.    FHT: Cat 1 (reassuring)  TOCO:        Intake/Output Summary (Last 24 hours) at 8/16/2020 0723  Last data filed at 8/15/2020 1557  Gross per 24 hour   Intake 849.17 ml   Output 1650 ml   Net -800.83 ml       Significant Labs:  Recent Lab Results     None          Physical Exam:   Constitutional: She is oriented to person, place, and time. She appears well-developed and well-nourished. No distress.       Cardiovascular: Normal rate, regular rhythm and normal heart sounds.     Pulmonary/Chest: Effort normal and breath sounds normal.        Abdominal: Soft. Bowel sounds are normal. She exhibits no distension. There is no abdominal tenderness.             Musculoskeletal: Normal range of motion and moves all extremeties. No tenderness or edema.       Neurological: She is alert and oriented to person, place, and time. She displays abnormal reflex (hyperreflexia).    Skin: Skin is warm and dry.    Psychiatric: She has a normal mood and affect. Her behavior is normal. Thought content normal.

## 2020-08-16 NOTE — ASSESSMENT & PLAN NOTE
Takes labetalol 100mg po BID  Admit to L&D  Magnesium sulfate therapy   BMZ series begun   IV Labetalol/hydralazine per protocol   Reviewed lab findings and diagnosis of superimposed preeclampsia with the patient via the language line.  Explained the need for inpatient management until delivery.  Deliver by 34wga or sooner if bp cannot be controlled, development of any other severe features of preeclampsia, or worsening fetal well-being.  All questions answered.    8/15/20-Pt required IV labetalol and hydralazine yesterday for blood pressure control, but bp remained stable overnight on labetalol 400mg tid.  Pt to complete 24 hours IV magnesium sulfate and BMZ series this afternoon.  Will remain inpatient for close maternal fetal surveillance afterward.  Deliver by 34wga or sooner for worsening maternal or fetal status    08/16/20 - BP was stable overnight but increased to severe range this AM.  Improved after addition of Procardia 30 daily.  Continue with Labetalol 400 TID.  Pt again advised on treatment plan and goals.  Will consider proceeding with delivery prior to 34 WGA if BP remains difficult to control or worsens.

## 2020-08-17 PROBLEM — O45.90 PLACENTA ABRUPTION, DELIVERED, CURRENT HOSPITALIZATION: Status: ACTIVE | Noted: 2020-08-17

## 2020-08-17 PROBLEM — Z98.891 STATUS POST PRIMARY LOW TRANSVERSE CESAREAN SECTION: Status: ACTIVE | Noted: 2020-08-17

## 2020-08-17 LAB
ABO + RH BLD: NORMAL
ALBUMIN SERPL BCP-MCNC: 2.9 G/DL (ref 3.5–5.2)
ALP SERPL-CCNC: 182 U/L (ref 55–135)
ALT SERPL W/O P-5'-P-CCNC: 78 U/L (ref 10–44)
ANION GAP SERPL CALC-SCNC: 11 MMOL/L (ref 8–16)
APTT BLDCRRT: 23.8 SEC (ref 21–32)
AST SERPL-CCNC: 43 U/L (ref 10–40)
BILIRUB SERPL-MCNC: 0.1 MG/DL (ref 0.1–1)
BLD GP AB SCN CELLS X3 SERPL QL: NORMAL
BUN SERPL-MCNC: 16 MG/DL (ref 6–20)
CALCIUM SERPL-MCNC: 8.4 MG/DL (ref 8.7–10.5)
CHLORIDE SERPL-SCNC: 108 MMOL/L (ref 95–110)
CO2 SERPL-SCNC: 18 MMOL/L (ref 23–29)
CREAT SERPL-MCNC: 0.8 MG/DL (ref 0.5–1.4)
EST. GFR  (AFRICAN AMERICAN): >60 ML/MIN/1.73 M^2
EST. GFR  (NON AFRICAN AMERICAN): >60 ML/MIN/1.73 M^2
FIBRINOGEN PPP-MCNC: 440 MG/DL (ref 182–366)
GLUCOSE SERPL-MCNC: 81 MG/DL (ref 70–110)
HCO3 UR-SCNC: 20.4 MMOL/L (ref 24–28)
INR PPP: 0.8 (ref 0.8–1.2)
PCO2 BLDA: 42.9 MMHG (ref 35–45)
PH SMN: 7.28 [PH] (ref 7.35–7.45)
PO2 BLDA: 39 MMHG (ref 80–100)
POC BE: -6 MMOL/L
POC SATURATED O2: 67 % (ref 95–100)
POTASSIUM SERPL-SCNC: 4.5 MMOL/L (ref 3.5–5.1)
PROT SERPL-MCNC: 7 G/DL (ref 6–8.4)
PROTHROMBIN TIME: <9 SEC (ref 9–12.5)
SAMPLE: ABNORMAL
SITE: ABNORMAL
SODIUM SERPL-SCNC: 137 MMOL/L (ref 136–145)

## 2020-08-17 PROCEDURE — 99900035 HC TECH TIME PER 15 MIN (STAT)

## 2020-08-17 PROCEDURE — 63600175 PHARM REV CODE 636 W HCPCS: Performed by: MIDWIFE

## 2020-08-17 PROCEDURE — 25000003 PHARM REV CODE 250: Performed by: OBSTETRICS & GYNECOLOGY

## 2020-08-17 PROCEDURE — 63600175 PHARM REV CODE 636 W HCPCS: Performed by: OBSTETRICS & GYNECOLOGY

## 2020-08-17 PROCEDURE — 82803 BLOOD GASES ANY COMBINATION: CPT

## 2020-08-17 PROCEDURE — 99232 SBSQ HOSP IP/OBS MODERATE 35: CPT | Mod: ,,, | Performed by: OBSTETRICS & GYNECOLOGY

## 2020-08-17 PROCEDURE — 11000001 HC ACUTE MED/SURG PRIVATE ROOM

## 2020-08-17 PROCEDURE — 99232 PR SUBSEQUENT HOSPITAL CARE,LEVL II: ICD-10-PCS | Mod: ,,, | Performed by: OBSTETRICS & GYNECOLOGY

## 2020-08-17 PROCEDURE — 36416 COLLJ CAPILLARY BLOOD SPEC: CPT

## 2020-08-17 RX ORDER — OXYTOCIN/RINGER'S LACTATE 30/500 ML
95 PLASTIC BAG, INJECTION (ML) INTRAVENOUS ONCE
Status: DISCONTINUED | OUTPATIENT
Start: 2020-08-17 | End: 2020-08-21 | Stop reason: HOSPADM

## 2020-08-17 RX ORDER — CHLORHEXIDINE GLUCONATE ORAL RINSE 1.2 MG/ML
10 SOLUTION DENTAL
Status: DISCONTINUED | OUTPATIENT
Start: 2020-08-17 | End: 2020-08-21 | Stop reason: HOSPADM

## 2020-08-17 RX ORDER — LABETALOL HYDROCHLORIDE 5 MG/ML
40 INJECTION, SOLUTION INTRAVENOUS ONCE AS NEEDED
Status: DISCONTINUED | OUTPATIENT
Start: 2020-08-17 | End: 2020-08-17

## 2020-08-17 RX ORDER — ONDANSETRON 2 MG/ML
4 INJECTION INTRAMUSCULAR; INTRAVENOUS DAILY PRN
Status: DISCONTINUED | OUTPATIENT
Start: 2020-08-17 | End: 2020-08-21 | Stop reason: HOSPADM

## 2020-08-17 RX ORDER — SODIUM CHLORIDE, SODIUM LACTATE, POTASSIUM CHLORIDE, CALCIUM CHLORIDE 600; 310; 30; 20 MG/100ML; MG/100ML; MG/100ML; MG/100ML
INJECTION, SOLUTION INTRAVENOUS CONTINUOUS
Status: DISCONTINUED | OUTPATIENT
Start: 2020-08-17 | End: 2020-08-21 | Stop reason: HOSPADM

## 2020-08-17 RX ORDER — LABETALOL HYDROCHLORIDE 5 MG/ML
20 INJECTION, SOLUTION INTRAVENOUS ONCE AS NEEDED
Status: COMPLETED | OUTPATIENT
Start: 2020-08-17 | End: 2020-08-17

## 2020-08-17 RX ORDER — HYDROCORTISONE 25 MG/G
CREAM TOPICAL 3 TIMES DAILY PRN
Status: DISCONTINUED | OUTPATIENT
Start: 2020-08-17 | End: 2020-08-21 | Stop reason: HOSPADM

## 2020-08-17 RX ORDER — KETOROLAC TROMETHAMINE 30 MG/ML
30 INJECTION, SOLUTION INTRAMUSCULAR; INTRAVENOUS EVERY 8 HOURS
Status: COMPLETED | OUTPATIENT
Start: 2020-08-17 | End: 2020-08-17

## 2020-08-17 RX ORDER — HYDROCODONE BITARTRATE AND ACETAMINOPHEN 10; 325 MG/1; MG/1
1 TABLET ORAL EVERY 4 HOURS PRN
Status: DISCONTINUED | OUTPATIENT
Start: 2020-08-17 | End: 2020-08-21 | Stop reason: HOSPADM

## 2020-08-17 RX ORDER — HYDRALAZINE HYDROCHLORIDE 20 MG/ML
5 INJECTION INTRAMUSCULAR; INTRAVENOUS ONCE AS NEEDED
Status: COMPLETED | OUTPATIENT
Start: 2020-08-17 | End: 2020-08-17

## 2020-08-17 RX ORDER — BISACODYL 10 MG
10 SUPPOSITORY, RECTAL RECTAL ONCE AS NEEDED
Status: DISCONTINUED | OUTPATIENT
Start: 2020-08-17 | End: 2020-08-21 | Stop reason: HOSPADM

## 2020-08-17 RX ORDER — HYDROMORPHONE HYDROCHLORIDE 1 MG/ML
1 INJECTION, SOLUTION INTRAMUSCULAR; INTRAVENOUS; SUBCUTANEOUS EVERY 4 HOURS PRN
Status: DISCONTINUED | OUTPATIENT
Start: 2020-08-17 | End: 2020-08-21 | Stop reason: HOSPADM

## 2020-08-17 RX ORDER — SODIUM CHLORIDE 0.9 % (FLUSH) 0.9 %
10 SYRINGE (ML) INJECTION
Status: DISCONTINUED | OUTPATIENT
Start: 2020-08-17 | End: 2020-08-21 | Stop reason: HOSPADM

## 2020-08-17 RX ORDER — LABETALOL HYDROCHLORIDE 5 MG/ML
80 INJECTION, SOLUTION INTRAVENOUS ONCE AS NEEDED
Status: DISCONTINUED | OUTPATIENT
Start: 2020-08-17 | End: 2020-08-17

## 2020-08-17 RX ORDER — PRENATAL WITH FERROUS FUM AND FOLIC ACID 3080; 920; 120; 400; 22; 1.84; 3; 20; 10; 1; 12; 200; 27; 25; 2 [IU]/1; [IU]/1; MG/1; [IU]/1; MG/1; MG/1; MG/1; MG/1; MG/1; MG/1; UG/1; MG/1; MG/1; MG/1; MG/1
1 TABLET ORAL DAILY
Status: DISCONTINUED | OUTPATIENT
Start: 2020-08-17 | End: 2020-08-21 | Stop reason: HOSPADM

## 2020-08-17 RX ORDER — DIPHENHYDRAMINE HCL 25 MG
25 CAPSULE ORAL EVERY 4 HOURS PRN
Status: DISCONTINUED | OUTPATIENT
Start: 2020-08-17 | End: 2020-08-21 | Stop reason: HOSPADM

## 2020-08-17 RX ORDER — DOCUSATE SODIUM 100 MG/1
100 CAPSULE, LIQUID FILLED ORAL DAILY
Status: DISCONTINUED | OUTPATIENT
Start: 2020-08-17 | End: 2020-08-21 | Stop reason: HOSPADM

## 2020-08-17 RX ORDER — IBUPROFEN 800 MG/1
800 TABLET ORAL EVERY 8 HOURS
Status: DISCONTINUED | OUTPATIENT
Start: 2020-08-18 | End: 2020-08-21 | Stop reason: HOSPADM

## 2020-08-17 RX ORDER — DIPHENHYDRAMINE HYDROCHLORIDE 50 MG/ML
25 INJECTION INTRAMUSCULAR; INTRAVENOUS EVERY 6 HOURS PRN
Status: DISCONTINUED | OUTPATIENT
Start: 2020-08-17 | End: 2020-08-21 | Stop reason: HOSPADM

## 2020-08-17 RX ORDER — MISOPROSTOL 200 UG/1
200 TABLET ORAL ONCE AS NEEDED
Status: DISCONTINUED | OUTPATIENT
Start: 2020-08-17 | End: 2020-08-21 | Stop reason: HOSPADM

## 2020-08-17 RX ORDER — ACETAMINOPHEN 325 MG/1
650 TABLET ORAL EVERY 6 HOURS PRN
Status: DISCONTINUED | OUTPATIENT
Start: 2020-08-17 | End: 2020-08-21 | Stop reason: HOSPADM

## 2020-08-17 RX ORDER — HYDRALAZINE HYDROCHLORIDE 20 MG/ML
10 INJECTION INTRAMUSCULAR; INTRAVENOUS ONCE AS NEEDED
Status: DISCONTINUED | OUTPATIENT
Start: 2020-08-17 | End: 2020-08-21 | Stop reason: HOSPADM

## 2020-08-17 RX ORDER — AMMONIA 15 % (W/V)
0.3 AMPUL (EA) INHALATION CONTINUOUS PRN
Status: DISCONTINUED | OUTPATIENT
Start: 2020-08-17 | End: 2020-08-21 | Stop reason: HOSPADM

## 2020-08-17 RX ORDER — DIPHENHYDRAMINE HYDROCHLORIDE 50 MG/ML
25 INJECTION INTRAMUSCULAR; INTRAVENOUS EVERY 4 HOURS PRN
Status: DISCONTINUED | OUTPATIENT
Start: 2020-08-17 | End: 2020-08-21 | Stop reason: HOSPADM

## 2020-08-17 RX ORDER — AMOXICILLIN 250 MG
1 CAPSULE ORAL NIGHTLY PRN
Status: DISCONTINUED | OUTPATIENT
Start: 2020-08-17 | End: 2020-08-21 | Stop reason: HOSPADM

## 2020-08-17 RX ORDER — LABETALOL HYDROCHLORIDE 5 MG/ML
20 INJECTION, SOLUTION INTRAVENOUS ONCE AS NEEDED
Status: DISCONTINUED | OUTPATIENT
Start: 2020-08-17 | End: 2020-08-17

## 2020-08-17 RX ORDER — LABETALOL HYDROCHLORIDE 5 MG/ML
40 INJECTION, SOLUTION INTRAVENOUS ONCE AS NEEDED
Status: COMPLETED | OUTPATIENT
Start: 2020-08-17 | End: 2020-08-17

## 2020-08-17 RX ORDER — HYDROCODONE BITARTRATE AND ACETAMINOPHEN 5; 325 MG/1; MG/1
1 TABLET ORAL EVERY 4 HOURS PRN
Status: DISCONTINUED | OUTPATIENT
Start: 2020-08-17 | End: 2020-08-21 | Stop reason: HOSPADM

## 2020-08-17 RX ORDER — LABETALOL HYDROCHLORIDE 5 MG/ML
80 INJECTION, SOLUTION INTRAVENOUS ONCE AS NEEDED
Status: DISCONTINUED | OUTPATIENT
Start: 2020-08-17 | End: 2020-08-18

## 2020-08-17 RX ORDER — SODIUM CHLORIDE 9 MG/ML
INJECTION, SOLUTION INTRAVENOUS CONTINUOUS
Status: DISCONTINUED | OUTPATIENT
Start: 2020-08-17 | End: 2020-08-21 | Stop reason: HOSPADM

## 2020-08-17 RX ORDER — ONDANSETRON 8 MG/1
8 TABLET, ORALLY DISINTEGRATING ORAL EVERY 8 HOURS PRN
Status: DISCONTINUED | OUTPATIENT
Start: 2020-08-17 | End: 2020-08-21 | Stop reason: HOSPADM

## 2020-08-17 RX ORDER — SIMETHICONE 80 MG
1 TABLET,CHEWABLE ORAL EVERY 6 HOURS PRN
Status: DISCONTINUED | OUTPATIENT
Start: 2020-08-17 | End: 2020-08-21 | Stop reason: HOSPADM

## 2020-08-17 RX ORDER — HYDRALAZINE HYDROCHLORIDE 20 MG/ML
10 INJECTION INTRAMUSCULAR; INTRAVENOUS ONCE AS NEEDED
Status: DISCONTINUED | OUTPATIENT
Start: 2020-08-17 | End: 2020-08-17

## 2020-08-17 RX ADMIN — PRENATAL VITAMINS-IRON FUMARATE 27 MG IRON-FOLIC ACID 0.8 MG TABLET 1 TABLET: at 08:08

## 2020-08-17 RX ADMIN — DOCUSATE SODIUM 100 MG: 100 CAPSULE, LIQUID FILLED ORAL at 08:08

## 2020-08-17 RX ADMIN — KETOROLAC TROMETHAMINE 30 MG: 30 INJECTION, SOLUTION INTRAMUSCULAR at 01:08

## 2020-08-17 RX ADMIN — NIFEDIPINE 30 MG: 30 TABLET, FILM COATED, EXTENDED RELEASE ORAL at 08:08

## 2020-08-17 RX ADMIN — KETOROLAC TROMETHAMINE 30 MG: 30 INJECTION, SOLUTION INTRAMUSCULAR at 05:08

## 2020-08-17 RX ADMIN — MAGNESIUM SULFATE HEPTAHYDRATE 2 G/HR: 40 INJECTION, SOLUTION INTRAVENOUS at 03:08

## 2020-08-17 RX ADMIN — SODIUM CHLORIDE, SODIUM LACTATE, POTASSIUM CHLORIDE, AND CALCIUM CHLORIDE: .6; .31; .03; .02 INJECTION, SOLUTION INTRAVENOUS at 03:08

## 2020-08-17 RX ADMIN — HYDROCODONE BITARTRATE AND ACETAMINOPHEN 1 TABLET: 10; 325 TABLET ORAL at 07:08

## 2020-08-17 RX ADMIN — LABETALOL HYDROCHLORIDE 40 MG: 5 INJECTION, SOLUTION INTRAVENOUS at 01:08

## 2020-08-17 RX ADMIN — LABETALOL HYDROCHLORIDE 400 MG: 200 TABLET, FILM COATED ORAL at 08:08

## 2020-08-17 RX ADMIN — HYDRALAZINE HYDROCHLORIDE 5 MG: 20 INJECTION INTRAMUSCULAR; INTRAVENOUS at 01:08

## 2020-08-17 RX ADMIN — KETOROLAC TROMETHAMINE 30 MG: 30 INJECTION, SOLUTION INTRAMUSCULAR at 09:08

## 2020-08-17 RX ADMIN — Medication 500 ML/HR: at 12:08

## 2020-08-17 RX ADMIN — LABETALOL HYDROCHLORIDE 400 MG: 200 TABLET, FILM COATED ORAL at 09:08

## 2020-08-17 RX ADMIN — LABETALOL HYDROCHLORIDE 20 MG: 5 INJECTION, SOLUTION INTRAVENOUS at 12:08

## 2020-08-17 RX ADMIN — HYDROMORPHONE HYDROCHLORIDE 1 MG: 1 INJECTION, SOLUTION INTRAMUSCULAR; INTRAVENOUS; SUBCUTANEOUS at 04:08

## 2020-08-17 RX ADMIN — HYDROCODONE BITARTRATE AND ACETAMINOPHEN 1 TABLET: 10; 325 TABLET ORAL at 11:08

## 2020-08-17 NOTE — ASSESSMENT & PLAN NOTE
Takes labetalol 100mg po BID  Admit to L&D  Magnesium sulfate therapy   BMZ series begun   IV Labetalol/hydralazine per protocol   Reviewed lab findings and diagnosis of superimposed preeclampsia with the patient via the language line.  Explained the need for inpatient management until delivery.  Deliver by 34wga or sooner if bp cannot be controlled, development of any other severe features of preeclampsia, or worsening fetal well-being.  All questions answered.    8/15/20-Pt required IV labetalol and hydralazine yesterday for blood pressure control, but bp remained stable overnight on labetalol 400mg tid.  Pt to complete 24 hours IV magnesium sulfate and BMZ series this afternoon.  Will remain inpatient for close maternal fetal surveillance afterward.  Deliver by 34wga or sooner for worsening maternal or fetal status    08/16/20 - BP was stable overnight but increased to severe range this AM.  Improved after addition of Procardia 30 daily.  Continue with Labetalol 400 TID.  Pt again advised on treatment plan and goals.  Will consider proceeding with delivery prior to 34 WGA if BP remains difficult to control or worsens.     8/17/20 9:20 AM   Continue 24 hours of seizure prophylaxis with MgS04.   Cont aniti-HTN meds  Labetalol 400 TID and Procardia 30 BID.

## 2020-08-17 NOTE — TRANSFER OF CARE
Anesthesia Transfer of Care Note    Patient: Essie Bernal    Procedure(s) Performed: Procedure(s) (LRB):   SECTION (N/A)    Patient location: Labor and Delivery    Anesthesia Type: general    Transport from OR: Transported from OR on room air with adequate spontaneous ventilation    Post pain: adequate analgesia    Post assessment: no apparent anesthetic complications    Post vital signs: stable    Level of consciousness: awake, alert and oriented    Nausea/Vomiting: no nausea/vomiting    Complications: none    Transfer of care protocol was followed      Last vitals:   Visit Vitals  BP (!) 183/89   Pulse 73   Temp 36.8 °C (98.3 °F) (Oral)   Resp 18   LMP 2020   SpO2 97%   Breastfeeding No

## 2020-08-17 NOTE — L&D DELIVERY NOTE
[facility]   Section   Operative Note    SUMMARY     Date of Procedure: 2020     PRE-PROCEDURE COUNSELING:  Patient counseled on the risks, benefits, and alternatives to procedure.  Please see preoperative consents.   SCDs were applied and working prior to anesthesia induction.    PRE-OPERATIVE DIAGNOSIS:    1.  IUP at 28w2d  2.  Placental Abruption  3.  CHTN with Super-imposed Severe Pre-eclampsia  4.  Breech presentation    POST-OPERATIVE DIAGNOSIS: same    PROCEDURE: Emergency Primary Low Transverse  Section    SURGEON: Jordy Puente MD    ASSISTANT: LAPHONSE Yanez (presence necessary for procedure)    ANESTHESIA:  GETA    FINDINGS:  Single live female infant in complete breech presentation.  APGAR , cord pH 7.284 BE -6 Weight 2 lb 3 oz.  Normal uterus, tubes, ovaries.  Moderate sized retroplacental clot consistent with abruption    SPECIMEN:  Placenta and cord pH    EBL:  400 mL    COMPLICATIONS:  None    IMPLANTS:  None    PROCEDURE IN DETAIL:   The patient was seen in the Holding Room. The risks, benefits and alternatives were discussed. The patient agrees with the proposed plan, giving informed consent.  The patient was taken to Operating Room, identified as Essie Bernal and the procedure verified as  Delivery. A Time Out was held and the above information confirmed.    Preoperative antibiotics administered.  The patient was draped and prepped in the usual sterile fashion.  General endotracheal anesthesia was then obtained.  A Pfannenstiel skin incision was made and carried down through the subcutaneous tissue to the fascia. Fascial incision was then made and extended transversely. The fascia was  from the underlying rectus muscles superiorly and inferiorly. The peritoneum was identified, tented up, and entered sharply.  This incision was then extended superiorly and inferiorly with good visualization of the underlying bowel and  bladder.     The utero-vesical peritoneal reflection was incised transversely and the bladder flap was bluntly dissected from the lower uterine segment.  A low transverse uterine incision was made in the midline and extended laterally.  There was a moderate sized retroplacental clot noted and clear amniotic fluid. The female infant was delivered from complete breech presentation without difficulty and with Apgar scores of 1/4/7.  The umbilical cord was doubly clamped and cut.  Cord blood was obtained. Cord pH 7.284 BE -6. Infant was handed over to awaiting NICU team.  The placenta was removed intact and was sent to pathology.  The uterine incision was then approximated in a running locked fashion with 0-Chromic.     The abdomen and pelvis were irrigated and hemostasis noted.  The rectus muscles were then loosely approximated at the midline with 2-0 Chromic.  The fascia was then approximated in a running fashion with 0-Vicryl.  Wound was irrigated and hemostasis noted.  The skin was approximated with 4-0 Monocryl in a running subcuticular fashion and an abdominal silver dressing placed.  Instrument, sponge, and needle counts were correct prior the abdominal closure and at the conclusion of the case.     DISPOSITION: to recovery PP room           CONDITION: stable           Delivery Information for Deon Bernal    Birth information:  YOB: 2021   Time of birth: 11:44 PM   Sex: female   Head Delivery Date/Time: 2020 11:44 PM   Delivery type: , Low Transverse   Gestational Age: 28w1d    Delivery Providers    Delivering clinician: Jordy Puente MD   Provider Role    Sumaya Uribe PA-C First Assist    Krysten Nieto RN Registered Nurse    Corina Davis RN Registered Nurse    Espinoza Duenas, DO Night Respiratory Care Practitioner    Rashel Doll RN Registered Nurse    Bess Vann NP Nurse Practitioner    Leena Sheets ST Surgical Tech             Measurements    Weight:   Length:          Apgars    Living status: Living  Apgars:  1 min.:  5 min.:  10 min.:  15 min.:  20 min.:    Skin color:         Heart rate:         Reflex irritability:         Muscle tone:         Respiratory effort:         Total:                Operative Delivery    Forceps attempted?: No  Vacuum extractor attempted?: No         Shoulder Dystocia    Shoulder dystocia present?: No           Presentation    Presentation: Footling Breech           Interventions/Resuscitation    Method: Bulb Suctioning, Tactile Stimulation, Intubation, NICU Attended, Deep Suctioning, PPV       Cord    Vessels: 3 vessels  Complications: None  Delayed Cord Clamping?: No  Cord Clamped Date/Time: 2020 11:44 PM  Cord Blood Disposition: Lab  Gases Sent?: No  Stem Cell Collection (by MD): No       Placenta    Placenta delivery date/time: 2020 234  Placenta removal: Manual removal  Placenta appearance: Abnormal  Placenta disposition: pathology           Labor Events:       labor: No     Labor Onset Date/Time:         Dilation Complete Date/Time:         Start Pushing Date/Time:         Start Pushing Date/Time:       Rupture Date/Time:            Rupture type:          Fluid Amount:       Fluid Color:       Fluid Odor:       Membrane Status:                steroids: None     Antibiotics given for GBS: No     Induction: none     Indications for induction:        Augmentation:       Indications for augmentation:       Labor complications: Abruptio Placenta     Additional complications:          Cervical ripening:                     Delivery:      Episiotomy: None     Indication for Episiotomy:       Perineal Lacerations: None Repaired:      Periurethral Laceration:   Repaired:     Labial Laceration:   Repaired:     Sulcus Laceration:   Repaired:     Vaginal Laceration:   Repaired:     Cervical Laceration:   Repaired:     Repair suture:       Repair # of packets:       Last Value -  EBL - Nursing (mL): 0     Sum - EBL - Nursing (mL): 0     Last Value - EBL - Anesthesia (mL):      Calculated QBL (mL):       Vaginal Sweep Performed:       Surgicount Correct:         Other providers:       Anesthesia    Method: General          Details (if applicable):  Trial of Labor No    Categorization: Primary    Priority: Emergency   Indications for : Abruption   Incision Type: low transverse     Additional  information:  Forceps:    Vacuum:    Breech:    Observed anomalies    Other (Comments):

## 2020-08-17 NOTE — PLAN OF CARE
On PP mag after  delivery by C/S for placenta abruption and pre-e. BPs stable and WNL on this shift, 1500 labetalol dose held due to low BP and HR. Pt started breast bumping this AM. Tolerating clear liquid diet.

## 2020-08-17 NOTE — NURSING
Pt called nurse into room. Bright red bleeding in panties and in the sheets. Notified LD. CNM came to bedside. /89. Fetal heart tones 140. Pt then transferred to LD via bed.

## 2020-08-17 NOTE — ANESTHESIA PREPROCEDURE EVALUATION
2020  Essie Bernal is a 30 y.o., female.  Patient Active Problem List   Diagnosis    Chronic hypertension with superimposed pre-eclampsia    History of  delivery, currently pregnant in second trimester    History of severe pre-eclampsia    Breech presentation of fetus    Placenta abruption, delivered, current hospitalization    Status post primary low transverse  section     No current facility-administered medications on file prior to encounter.      Current Outpatient Medications on File Prior to Encounter   Medication Sig Dispense Refill    labetaloL (NORMODYNE) 100 MG tablet Take 1 tablet (100 mg total) by mouth every 12 (twelve) hours. 60 tablet 6    PRENATAL 28 mg iron- 800 mcg Tab TK 1 T PO  D       History reviewed. No pertinent surgical history.    Anesthesia Evaluation    I have reviewed the Patient Summary Reports.    I have reviewed the Nursing Notes.    I have reviewed the Medications.     Review of Systems  Anesthesia Hx:  No problems with previous Anesthesia   Denies Personal Hx of Anesthesia complications.   Social:  Non-Smoker    Hematology/Oncology:  Hematology Normal   Oncology Normal     EENT/Dental:EENT/Dental Normal   Cardiovascular:   Hypertension    Pulmonary:  Pulmonary Normal    Renal/:  Renal/ Normal     Hepatic/GI:  Hepatic/GI Normal    Musculoskeletal:  Musculoskeletal Normal    Neurological:  Neurology Normal    Endocrine:  Endocrine Normal    Dermatological:  Skin Normal    Psych:  Psychiatric Normal           Physical Exam  General:  Well nourished, Obesity    Airway/Jaw/Neck:  Airway Findings: Mouth Opening: Normal Tongue: Normal  Mallampati: II      Dental:  Dental Findings: In tact   Chest/Lungs:  Chest/Lungs Clear    Heart/Vascular:  Heart Findings: Normal Heart murmur: negative       Mental Status:  Mental Status Findings:   Cooperative, Alert and Oriented         Chemistry        Component Value Date/Time     08/16/2020 2323    K 4.5 08/16/2020 2323     08/16/2020 2323    CO2 18 (L) 08/16/2020 2323    BUN 16 08/16/2020 2323    CREATININE 0.8 08/16/2020 2323    GLU 81 08/16/2020 2323        Component Value Date/Time    CALCIUM 8.4 (L) 08/16/2020 2323    ALKPHOS 182 (H) 08/16/2020 2323    AST 43 (H) 08/16/2020 2323    ALT 78 (H) 08/16/2020 2323    BILITOT 0.1 08/16/2020 2323    ESTGFRAFRICA >60 08/16/2020 2323    EGFRNONAA >60 08/16/2020 2323        Lab Results   Component Value Date    WBC 14.00 (H) 08/16/2020    HGB 11.2 (L) 08/16/2020    HCT 35.5 (L) 08/16/2020    MCV 93 08/16/2020     08/16/2020           Anesthesia Plan  Type of Anesthesia, risks & benefits discussed:  Anesthesia Type:  general  Patient's Preference:   Intra-op Monitoring Plan: standard ASA monitors  Intra-op Monitoring Plan Comments:   Post Op Pain Control Plan: multimodal analgesia  Post Op Pain Control Plan Comments:   Induction:   IV  Beta Blocker:  Patient is on a Beta-Blocker and has received one dose within the past 24 hours (No further documentation required).       Informed Consent: Patient understands risks and agrees with Anesthesia plan.  Questions answered. Anesthesia consent signed with patient.  ASA Score: 4  emergent   Day of Surgery Review of History & Physical: I have interviewed and examined the patient. I have reviewed the patient's H&P dated:    H&P update referred to the surgeon.         Ready For Surgery From Anesthesia Perspective.

## 2020-08-17 NOTE — PROGRESS NOTES
Called secondary to BP becoming more labile and pt with vaginal bleeding.  's.  Recommend immediate C/S for abruption and starting Mg. CNM alerted L+D and NICU teams and pt was transferred to OR.  I arrived when pt was in OR.  Procedure details, indications, risks, benefits, and alternatives were reviewed with pt.  Pt voiced understanding and desires to proceed with emergency C/S.  Hospital consents were reviewed with pt and signed.  OR team ready.

## 2020-08-17 NOTE — ANESTHESIA POSTPROCEDURE EVALUATION
Anesthesia Post Evaluation    Patient: Essie Bernal    Procedure(s) Performed: Procedure(s) (LRB):   SECTION (N/A)    Final Anesthesia Type: general    Patient location during evaluation: PACU  Patient participation: Yes- Able to Participate  Level of consciousness: awake and alert, oriented and awake  Post-procedure vital signs: reviewed and stable  Pain management: adequate  Airway patency: patent    PONV status at discharge: No PONV  Anesthetic complications: no      Cardiovascular status: blood pressure returned to baseline  Respiratory status: unassisted and spontaneous ventilation  Hydration status: euvolemic  Follow-up not needed.          Vitals Value Taken Time   /75 20 0603   Temp 36.4 °C (97.5 °F) 20 0403   Pulse 55 20 0603   Resp 18 20 0415   SpO2 98 % 20 0603         No case tracking events are documented in the log.      Pain/Supa Score: Pain Rating Prior to Med Admin: 0 (2020  5:56 AM)  Supa Score: 10 (2020  1:30 AM)

## 2020-08-17 NOTE — ANESTHESIA PROCEDURE NOTES
Intubation  Performed by: Amy Holstein, CRNA  Authorized by: Zaki Petersen MD     Intubation:     Induction:  Rapid sequence induction    Intubated:  Postinduction    Attempts:  1    Attempted By:  CRNA    Method of Intubation:  Direct    Blade:  Rivera 2    Laryngeal View Grade: Grade I - full view of chords      Difficult Airway Encountered?: No      Complications:  None    Airway Device Size:  7.0    Style/Cuff Inflation:  Cuffed (inflated to minimal occlusive pressure)    Tube secured:  21    Placement Verified By:  Capnometry    Complicating Factors:  None    Findings Post-Intubation:  BS equal bilateral and atraumatic/condition of teeth unchanged  Notes:      RSI w/ cricoid pressure; induction performed when surgeon ready to cut

## 2020-08-17 NOTE — PROGRESS NOTES
[facility] -   Obstetrics  Postpartum Progress Note    Patient Name: Essie Bernal  MRN: [id number]  Admission Date: 2020  Hospital Length of Stay: 3 days  Attending Physician: Jordy Puente MD  Primary Care Provider: Primary Doctor No    Subjective:     Principal Problem:Status post primary low transverse  section    Hospital Course:  Upon admission to L&D, bp in the severely elevated range, patient hyperreflexic, and labs significant for urine P/C ratio 1.07.    IV magnesium sulfate for seizure prophylaxis and fetal neuroprotection started.  Betamethasone series initiated.  IV labetalol/hydralazine protocol started.  8/15- BP stable on labetalol 400mg tid.     Interval History:    [employer] service was used.   She is doing well this morning. She is tolerating a regular diet without nausea or vomiting. She is voiding spontaneously. She is not yet ambulating. She has not yet passed flatus, and has not a BM. Vaginal bleeding is mild. She denies fever or chills. Abdominal pain is mild and controlled with oral medications. She is not yet breastfeeding but desires to pump and breastfeeding.. She desires circumcision for her male baby: not applicable.  No chest pain, shortness of breath, or palpitations.  No headache, vision change, or right upper quadrant pain.      Objective:     Vital Signs (Most Recent):  Temp: 97.7 °F (36.5 °C) (20 0700)  Pulse: (!) 54 (20 0900)  Resp: 18 (20 09)  BP: (!) 148/77 (20 0900)  SpO2: 99 % (20 0900) Vital Signs (24h Range):  Temp:  [97.2 °F (36.2 °C)-98.3 °F (36.8 °C)] 97.7 °F (36.5 °C)  Pulse:  [52-73] 54  Resp:  [18] 18  SpO2:  [98 %-100 %] 99 %  BP: (137-183)/() 148/77        There is no height or weight on file to calculate BMI.      Intake/Output Summary (Last 24 hours) at 2020 09  Last data filed at 2020 09  Gross per 24 hour   Intake 825 ml   Output 3610 ml   Net -2785 ml            Significant Labs:  Lab Results   Component Value Date    GROUPTRH O POS 2020    HEPBSAG Negative 2020     Recent Labs   Lab 20  2323   HGB 11.2*   HCT 35.5*       I have personallly reviewed all pertinent lab results from the last 24 hours.       Physical Exam:   Constitutional: She is oriented to person, place, and time. She appears well-developed and well-nourished.    HENT:   Head: Normocephalic.     Neck: Normal range of motion. No thyromegaly present.    Cardiovascular: Normal rate, regular rhythm and normal heart sounds.    No murmur heard.   Pulmonary/Chest: Breath sounds normal. No respiratory distress. She has no wheezes. She has no rales. Right breast exhibits no mass, no tenderness and no swelling. Left breast exhibits no mass, no tenderness and no swelling.   No breast engorgement        Abdominal: Soft. She exhibits no distension. There is no abdominal tenderness (uterine fundus firm below umbilicus and appropriately tender for post delivery).   pressure dressing intact and dry     Genitourinary:    Genitourinary Comments: Lochia within normal             Musculoskeletal: Normal range of motion and moves all extremeties. Edema (2+ bilateral LE) present. No tenderness.      Comments: No calf tenderness or Michele's sign.       Neurological: She is alert and oriented to person, place, and time. She has normal reflexes.   No clonus, 2+ all limbs    Skin: Skin is warm and dry.    Psychiatric: She has a normal mood and affect.       Assessment/Plan:     30 y.o. female  for:    Breech presentation of fetus  20: ultrasound: EFW 965g (25th%), breech, normal AFV  Explained need for  for delivery if baby remains breech    Chronic hypertension with superimposed pre-eclampsia  Takes labetalol 100mg po BID  Admit to L&D  Magnesium sulfate therapy   BMZ series begun   IV Labetalol/hydralazine per protocol   Reviewed lab findings and diagnosis of superimposed preeclampsia  with the patient via the language line.  Explained the need for inpatient management until delivery.  Deliver by 34wga or sooner if bp cannot be controlled, development of any other severe features of preeclampsia, or worsening fetal well-being.  All questions answered.    8/15/20-Pt required IV labetalol and hydralazine yesterday for blood pressure control, but bp remained stable overnight on labetalol 400mg tid.  Pt to complete 24 hours IV magnesium sulfate and BMZ series this afternoon.  Will remain inpatient for close maternal fetal surveillance afterward.  Deliver by 34wga or sooner for worsening maternal or fetal status    08/16/20 - BP was stable overnight but increased to severe range this AM.  Improved after addition of Procardia 30 daily.  Continue with Labetalol 400 TID.  Pt again advised on treatment plan and goals.  Will consider proceeding with delivery prior to 34 WGA if BP remains difficult to control or worsens.     8/17/20 9:20 AM   Continue 24 hours of seizure prophylaxis with MgS04.   Cont aniti-HTN meds  Labetalol 400 TID and Procardia 30 BID.         Disposition: As patient meets milestones, will plan to discharge  .    Casi Macias MD  Obstetrics  [facility]

## 2020-08-17 NOTE — SUBJECTIVE & OBJECTIVE
Interval History:    [employer] service was used.   She is doing well this morning. She is tolerating a regular diet without nausea or vomiting. She is voiding spontaneously. She is not yet ambulating. She has not yet passed flatus, and has not a BM. Vaginal bleeding is mild. She denies fever or chills. Abdominal pain is mild and controlled with oral medications. She is not yet breastfeeding but desires to pump and breastfeeding.. She desires circumcision for her male baby: not applicable.  No chest pain, shortness of breath, or palpitations.  No headache, vision change, or right upper quadrant pain.      Objective:     Vital Signs (Most Recent):  Temp: 97.7 °F (36.5 °C) (08/17/20 0700)  Pulse: (!) 54 (08/17/20 0900)  Resp: 18 (08/17/20 0900)  BP: (!) 148/77 (08/17/20 0900)  SpO2: 99 % (08/17/20 0900) Vital Signs (24h Range):  Temp:  [97.2 °F (36.2 °C)-98.3 °F (36.8 °C)] 97.7 °F (36.5 °C)  Pulse:  [52-73] 54  Resp:  [18] 18  SpO2:  [98 %-100 %] 99 %  BP: (137-183)/() 148/77        There is no height or weight on file to calculate BMI.      Intake/Output Summary (Last 24 hours) at 8/17/2020 0905  Last data filed at 8/17/2020 0900  Gross per 24 hour   Intake 825 ml   Output 3610 ml   Net -2785 ml           Significant Labs:  Lab Results   Component Value Date    GROUPTRH O POS 08/16/2020    HEPBSAG Negative 04/22/2020     Recent Labs   Lab 08/16/20  2323   HGB 11.2*   HCT 35.5*       I have personallly reviewed all pertinent lab results from the last 24 hours.       Physical Exam:   Constitutional: She is oriented to person, place, and time. She appears well-developed and well-nourished.    HENT:   Head: Normocephalic.     Neck: Normal range of motion. No thyromegaly present.    Cardiovascular: Normal rate, regular rhythm and normal heart sounds.    No murmur heard.   Pulmonary/Chest: Breath sounds normal. No respiratory distress. She has no wheezes. She has no rales. Right breast exhibits no mass, no  tenderness and no swelling. Left breast exhibits no mass, no tenderness and no swelling.   No breast engorgement        Abdominal: Soft. She exhibits no distension. There is no abdominal tenderness (uterine fundus firm below umbilicus and appropriately tender for post delivery).   pressure dressing intact and dry     Genitourinary:    Genitourinary Comments: Lochia within normal             Musculoskeletal: Normal range of motion and moves all extremeties. Edema (2+ bilateral LE) present. No tenderness.      Comments: No calf tenderness or Michele's sign.       Neurological: She is alert and oriented to person, place, and time. She has normal reflexes.   No clonus, 2+ all limbs    Skin: Skin is warm and dry.    Psychiatric: She has a normal mood and affect.

## 2020-08-17 NOTE — PROGRESS NOTES
Called to patient's room due to gush of bright red blood. 's/80's. FHT's 140's spot check. Patient transferred to L&D. Dr. Puente called. Will prepare for c/s ASAP, and start MagSO4 for seizure prophylaxis.

## 2020-08-17 NOTE — LACTATION NOTE
Lactation Rounds.      Mother reclined in bed.  TOBIN used to discuss breastfeeding plans with mother.  She states she would like to establish a breastmilk supply for her NICU baby.      Sauce Labshony breast pump set up at bedside.  Instructed on proper usage and to adjust suction according to comfort level. Verified appropriate flange fit- 24mm. Reviewed frequency and duration of pumping in order to promote and maintain full milk supply. Hands-on pumping technique reviewed. Encouraged hand expression after. Instructed on proper cleaning of breast pump parts. Reviewed proper milk handling, collection, storage, and transportation. Voices understanding.    10 mL milk obtained.  Labeled and brought to NICU.    Briefly reviewed hand expression.  Breast tissue is very dense, minimal response to hand expression.      Primary nurse advised.  Mother will need assistance with pumping Q3 hours.

## 2020-08-18 PROBLEM — O09.892 HISTORY OF PRETERM DELIVERY, CURRENTLY PREGNANT IN SECOND TRIMESTER: Status: RESOLVED | Noted: 2020-06-12 | Resolved: 2020-08-18

## 2020-08-18 PROCEDURE — 25000003 PHARM REV CODE 250: Performed by: OBSTETRICS & GYNECOLOGY

## 2020-08-18 PROCEDURE — 99231 SBSQ HOSP IP/OBS SF/LOW 25: CPT | Mod: ,,, | Performed by: OBSTETRICS & GYNECOLOGY

## 2020-08-18 PROCEDURE — 11000001 HC ACUTE MED/SURG PRIVATE ROOM

## 2020-08-18 PROCEDURE — 99231 PR SUBSEQUENT HOSPITAL CARE,LEVL I: ICD-10-PCS | Mod: ,,, | Performed by: OBSTETRICS & GYNECOLOGY

## 2020-08-18 RX ORDER — LABETALOL HCL 20 MG/4 ML
80 SYRINGE (ML) INTRAVENOUS ONCE AS NEEDED
Status: DISCONTINUED | OUTPATIENT
Start: 2020-08-18 | End: 2020-08-21 | Stop reason: HOSPADM

## 2020-08-18 RX ORDER — LABETALOL HYDROCHLORIDE 5 MG/ML
80 INJECTION, SOLUTION INTRAVENOUS ONCE AS NEEDED
Status: DISCONTINUED | OUTPATIENT
Start: 2020-08-18 | End: 2020-08-18

## 2020-08-18 RX ADMIN — HYDROCODONE BITARTRATE AND ACETAMINOPHEN 1 TABLET: 10; 325 TABLET ORAL at 12:08

## 2020-08-18 RX ADMIN — IBUPROFEN 800 MG: 800 TABLET, FILM COATED ORAL at 09:08

## 2020-08-18 RX ADMIN — PRENATAL VITAMINS-IRON FUMARATE 27 MG IRON-FOLIC ACID 0.8 MG TABLET 1 TABLET: at 09:08

## 2020-08-18 RX ADMIN — DOCUSATE SODIUM 100 MG: 100 CAPSULE, LIQUID FILLED ORAL at 09:08

## 2020-08-18 RX ADMIN — LABETALOL HYDROCHLORIDE 400 MG: 200 TABLET, FILM COATED ORAL at 09:08

## 2020-08-18 RX ADMIN — IBUPROFEN 800 MG: 800 TABLET, FILM COATED ORAL at 06:08

## 2020-08-18 RX ADMIN — LABETALOL HYDROCHLORIDE 400 MG: 200 TABLET, FILM COATED ORAL at 02:08

## 2020-08-18 RX ADMIN — NIFEDIPINE 30 MG: 30 TABLET, FILM COATED, EXTENDED RELEASE ORAL at 06:08

## 2020-08-18 RX ADMIN — HYDROCODONE BITARTRATE AND ACETAMINOPHEN 1 TABLET: 10; 325 TABLET ORAL at 01:08

## 2020-08-18 RX ADMIN — IBUPROFEN 800 MG: 800 TABLET, FILM COATED ORAL at 02:08

## 2020-08-18 NOTE — ASSESSMENT & PLAN NOTE
Takes labetalol 100mg po BID  Admit to L&D  Magnesium sulfate therapy   BMZ series begun   IV Labetalol/hydralazine per protocol   Reviewed lab findings and diagnosis of superimposed preeclampsia with the patient via the language line.  Explained the need for inpatient management until delivery.  Deliver by 34wga or sooner if bp cannot be controlled, development of any other severe features of preeclampsia, or worsening fetal well-being.  All questions answered.    8/15/20-Pt required IV labetalol and hydralazine yesterday for blood pressure control, but bp remained stable overnight on labetalol 400mg tid.  Pt to complete 24 hours IV magnesium sulfate and BMZ series this afternoon.  Will remain inpatient for close maternal fetal surveillance afterward.  Deliver by 34wga or sooner for worsening maternal or fetal status    08/16/20 - BP was stable overnight but increased to severe range this AM.  Improved after addition of Procardia 30 daily.  Continue with Labetalol 400 TID.  Pt again advised on treatment plan and goals.  Will consider proceeding with delivery prior to 34 WGA if BP remains difficult to control or worsens.     8/17/20 9:20 AM   Continue 24 hours of seizure prophylaxis with MgS04.   Cont aniti-HTN meds  Labetalol 400 TID and Procardia 30 BID.   8/18/20-s/p 24 hr seizure prophylaxis with magnesium sulfate (dc'd at 2300); cotintue to manage blood pressures; remains on labetalol 400mg tid and procardia 30 bid

## 2020-08-18 NOTE — SUBJECTIVE & OBJECTIVE
Interval History:   Pod #1    She is doing well this morning. Denies headache, blurry vision or seeing spots.  She is tolerating a regular diet without nausea or vomiting. She is voiding spontaneously. She is ambulating (only to bathroom). She has passed flatus, and has not a BM. Vaginal bleeding is mild. She denies fever or chills. Abdominal pain is mild and controlled with oral medications. She is breastfeeding (pumping).     Objective:     Vital Signs (Most Recent):  Temp: 98.3 °F (36.8 °C) (08/18/20 0749)  Pulse: 66 (08/18/20 0749)  Resp: 18 (08/18/20 0749)  BP: (!) 140/74 (08/18/20 0749)  SpO2: 99 % (08/18/20 0604) Vital Signs (24h Range):  Temp:  [98.2 °F (36.8 °C)-98.3 °F (36.8 °C)] 98.3 °F (36.8 °C)  Pulse:  [0-83] 66  Resp:  [16-20] 18  SpO2:  [95 %-100 %] 99 %  BP: (112-157)/(54-92) 140/74     Weight: 79.2 kg (174 lb 9.7 oz)  Body mass index is 31.94 kg/m².      Intake/Output Summary (Last 24 hours) at 8/18/2020 0818  Last data filed at 8/18/2020 0400  Gross per 24 hour   Intake 1970 ml   Output 4190 ml   Net -2220 ml           Significant Labs:  Lab Results   Component Value Date    GROUPTRH O POS 08/16/2020    HEPBSAG Negative 04/22/2020     Recent Labs   Lab 08/16/20  2323   HGB 11.2*   HCT 35.5*       I have personallly reviewed all pertinent lab results from the last 24 hours.  Recent Lab Results     None          Physical Exam:   Constitutional: She is oriented to person, place, and time. She appears well-developed.    HENT:   Head: Normocephalic.    Eyes: Pupils are equal, round, and reactive to light.    Neck: Normal range of motion.    Cardiovascular: Normal rate and regular rhythm.     Pulmonary/Chest: Effort normal and breath sounds normal.        Abdominal: Soft. Bowel sounds are normal. She exhibits no abdominal incision.     Genitourinary:    Genitourinary Comments: Gravid, non tender             Musculoskeletal: Normal range of motion and moves all extremeties. No edema.       Neurological:  She is alert and oriented to person, place, and time. She has normal reflexes.    Skin: Skin is warm and dry.    Psychiatric: She has a normal mood and affect. Her behavior is normal. Judgment and thought content normal.

## 2020-08-18 NOTE — NURSING
Ok to trasnfer to Mother baby unit per MD order. SP Mag, last two /56 121/60 and asymptomatic. Instructed to hold scheduled Procardia and Labatelol at this time as well.

## 2020-08-18 NOTE — PROGRESS NOTES
[facility] -   Obstetrics  Postpartum Progress Note    Patient Name: Essie Bernal  MRN: [id number]  Admission Date: 2020  Hospital Length of Stay: 4 days  Attending Physician: Jordy Puente MD  Primary Care Provider: Primary Doctor No    Subjective:     Principal Problem:Status post primary low transverse  section    Hospital Course:  Upon admission to L&D, bp in the severely elevated range, patient hyperreflexic, and labs significant for urine P/C ratio 1.07.    IV magnesium sulfate for seizure prophylaxis and fetal neuroprotection started.  Betamethasone series initiated.  IV labetalol/hydralazine protocol started.  8/15- BP stable on labetalol 400mg tid.   20-placental abruption  20-primary low transverse c/section , severe preE, placental abruption; breech  20-magnesium sulfate for 24 hrs; bp controlled with labetalol 400mg tid, procardia 30 bid  20-transferred to MBU,     Interval History:   Pod #1    She is doing well this morning. Denies headache, blurry vision or seeing spots.  She is tolerating a regular diet without nausea or vomiting. She is voiding spontaneously. She is ambulating (only to bathroom). She has passed flatus, and has not a BM. Vaginal bleeding is mild. She denies fever or chills. Abdominal pain is mild and controlled with oral medications. She is breastfeeding (pumping).     Objective:     Vital Signs (Most Recent):  Temp: 98.3 °F (36.8 °C) (20 0749)  Pulse: 66 (20 0749)  Resp: 18 (20 0749)  BP: (!) 140/74 (20 0749)  SpO2: 99 % (20 0604) Vital Signs (24h Range):  Temp:  [98.2 °F (36.8 °C)-98.3 °F (36.8 °C)] 98.3 °F (36.8 °C)  Pulse:  [0-83] 66  Resp:  [16-20] 18  SpO2:  [95 %-100 %] 99 %  BP: (112-157)/(54-92) 140/74     Weight: 79.2 kg (174 lb 9.7 oz)  Body mass index is 31.94 kg/m².      Intake/Output Summary (Last 24 hours) at 2020 0818  Last data filed at 2020 0400  Gross per 24 hour    Intake 1970 ml   Output 4190 ml   Net -2220 ml           Significant Labs:  Lab Results   Component Value Date    GROUPTRH O POS 2020    HEPBSAG Negative 2020     Recent Labs   Lab 20  2323   HGB 11.2*   HCT 35.5*       I have personallly reviewed all pertinent lab results from the last 24 hours.  Recent Lab Results     None          Physical Exam:   Constitutional: She is oriented to person, place, and time. She appears well-developed.    HENT:   Head: Normocephalic.    Eyes: Pupils are equal, round, and reactive to light.    Neck: Normal range of motion.    Cardiovascular: Normal rate and regular rhythm.     Pulmonary/Chest: Effort normal and breath sounds normal.        Abdominal: Soft. Bowel sounds are normal. She exhibits no abdominal incision.     Genitourinary:    Genitourinary Comments: Gravid, non tender             Musculoskeletal: Normal range of motion and moves all extremeties. No edema.       Neurological: She is alert and oriented to person, place, and time. She has normal reflexes.    Skin: Skin is warm and dry.    Psychiatric: She has a normal mood and affect. Her behavior is normal. Judgment and thought content normal.       Assessment/Plan:     30 y.o. female  for:    * Status post primary low transverse  section  2020  Pod #1 s/p primary low transverse c/section at 28w1d for placental abruption, breech, severe preE  Afebrile, infant doing well in nicu  Continue dietary advances  Continue breast pumping  Encouraged ambulation and deep breathing exercises  Aware ok to shower  Anticipate discharge in 2-3 days    Chronic hypertension with superimposed pre-eclampsia  Takes labetalol 100mg po BID  Admit to L&D  Magnesium sulfate therapy   BMZ series begun   IV Labetalol/hydralazine per protocol   Reviewed lab findings and diagnosis of superimposed preeclampsia with the patient via the language line.  Explained the need for inpatient management until delivery.   Deliver by 34wga or sooner if bp cannot be controlled, development of any other severe features of preeclampsia, or worsening fetal well-being.  All questions answered.    8/15/20-Pt required IV labetalol and hydralazine yesterday for blood pressure control, but bp remained stable overnight on labetalol 400mg tid.  Pt to complete 24 hours IV magnesium sulfate and BMZ series this afternoon.  Will remain inpatient for close maternal fetal surveillance afterward.  Deliver by 34wga or sooner for worsening maternal or fetal status    08/16/20 - BP was stable overnight but increased to severe range this AM.  Improved after addition of Procardia 30 daily.  Continue with Labetalol 400 TID.  Pt again advised on treatment plan and goals.  Will consider proceeding with delivery prior to 34 WGA if BP remains difficult to control or worsens.     8/17/20 9:20 AM   Continue 24 hours of seizure prophylaxis with MgS04.   Cont aniti-HTN meds  Labetalol 400 TID and Procardia 30 BID.   8/18/20-s/p 24 hr seizure prophylaxis with magnesium sulfate (dc'd at 2300); cotintue to manage blood pressures; remains on labetalol 400mg tid and procardia 30 bid        Disposition: As patient meets milestones, will plan to discharge in 2-3 days.    Lluvia Westfall MD  Obstetrics  [facility] -

## 2020-08-18 NOTE — LACTATION NOTE
"Lactation Rounds:    [place] Language Line used during encounter      Mother states she has not pumped since this morning.   [email or web address] breast pump set up at bedside.  Instructed on proper usage and to adjust suction according to comfort level. Verified appropriate flange fit- 27mm. (24mm created some cracks at 3 o'clock on left nipple) Reviewed frequency and duration of pumping in order to promote and maintain full milk supply. Hands-on pumping technique reviewed. Encouraged hand expression after. Instructed on proper cleaning of breast pump parts. Reviewed proper milk handling, collection, storage, and transportation. Voices understanding.    [place] NICU pumping guide reviewed with mother. Encouraged mother to set an alarm for every 3 hours and pump overnight. Mother verbalized understanding.     71mLs of EMB pumped in 15 minutes using hands on pumping. Milk appears mature, not colostrum. Asked mother if she is breastfeeding any other children. She stated "no they are all older". Breastmilk label dated and timed and verified with 2 RNs. Placed in infants NICU bin.     Pump parts were washed and laid out to dry. Mother denies any other questions or concerns at this time. Instructed to call lactation as needed for assistance.     "

## 2020-08-18 NOTE — ASSESSMENT & PLAN NOTE
08/18/2020  Pod #1 s/p primary low transverse c/section at 28w1d for placental abruption, breech, severe preE  Afebrile, infant doing well in nicu  Continue dietary advances  Continue breast pumping  Encouraged ambulation and deep breathing exercises  Aware ok to shower  Anticipate discharge in 2-3 days

## 2020-08-18 NOTE — NURSING
Patient progressing well today. Very light lochia and pain well controlled on po meds. Visited NICU for almost 4 hours earlier today. Pumping for baby. BP was low this morning so 0900 doses held. BP started to increase over the day and 1400 labetolol given as scheduled. BP is still elevated, so I called Dr Westfall and received orders to give nifedipine dose early (now).

## 2020-08-19 LAB
FINAL PATHOLOGIC DIAGNOSIS: NORMAL
GROSS: NORMAL

## 2020-08-19 PROCEDURE — 25000003 PHARM REV CODE 250: Performed by: OBSTETRICS & GYNECOLOGY

## 2020-08-19 PROCEDURE — 99232 PR SUBSEQUENT HOSPITAL CARE,LEVL II: ICD-10-PCS | Mod: ,,, | Performed by: OBSTETRICS & GYNECOLOGY

## 2020-08-19 PROCEDURE — 11000001 HC ACUTE MED/SURG PRIVATE ROOM

## 2020-08-19 PROCEDURE — 99232 SBSQ HOSP IP/OBS MODERATE 35: CPT | Mod: ,,, | Performed by: OBSTETRICS & GYNECOLOGY

## 2020-08-19 RX ADMIN — PRENATAL VITAMINS-IRON FUMARATE 27 MG IRON-FOLIC ACID 0.8 MG TABLET 1 TABLET: at 08:08

## 2020-08-19 RX ADMIN — NIFEDIPINE 30 MG: 30 TABLET, FILM COATED, EXTENDED RELEASE ORAL at 09:08

## 2020-08-19 RX ADMIN — LABETALOL HYDROCHLORIDE 400 MG: 200 TABLET, FILM COATED ORAL at 08:08

## 2020-08-19 RX ADMIN — DOCUSATE SODIUM 100 MG: 100 CAPSULE, LIQUID FILLED ORAL at 08:08

## 2020-08-19 RX ADMIN — IBUPROFEN 800 MG: 800 TABLET, FILM COATED ORAL at 01:08

## 2020-08-19 RX ADMIN — IBUPROFEN 800 MG: 800 TABLET, FILM COATED ORAL at 06:08

## 2020-08-19 RX ADMIN — LABETALOL HYDROCHLORIDE 400 MG: 200 TABLET, FILM COATED ORAL at 09:08

## 2020-08-19 RX ADMIN — IBUPROFEN 800 MG: 800 TABLET, FILM COATED ORAL at 09:08

## 2020-08-19 RX ADMIN — LABETALOL HYDROCHLORIDE 400 MG: 200 TABLET, FILM COATED ORAL at 02:08

## 2020-08-19 RX ADMIN — NIFEDIPINE 30 MG: 30 TABLET, FILM COATED, EXTENDED RELEASE ORAL at 08:08

## 2020-08-19 NOTE — PLAN OF CARE
Problem: Adult Inpatient Plan of Care  Goal: Plan of Care Review  Outcome: Ongoing, Progressing     Patient progressing well. Visits  in NICU and utilizes double electric breast pump. Pain controlled with oral medications. Ambulates independently and voids without difficulty. Aquacel clean, dry, and intact. Light vaginal bleeding. Vital signs stable. Will continue to monitor.

## 2020-08-19 NOTE — ASSESSMENT & PLAN NOTE
Pod #2 s/p emergency primary low transverse c/section at 28w1d for placental abruption, breech, severe preE  Pt is doing well.  Proceed with routine post-operative care.  Pt counseled on management plan and discharge goals.

## 2020-08-19 NOTE — PROGRESS NOTES
[facility] -   Obstetrics  Postpartum Progress Note    Patient Name: Essie Bernal  MRN: [id number]  Admission Date: 2020  Hospital Length of Stay: 5 days  Attending Physician: Jordy Puente MD  Primary Care Provider: Primary Doctor No    Subjective:     Principal Problem:Status post primary low transverse  section    Hospital Course:  Upon admission to L&D, bp in the severely elevated range, patient hyperreflexic, and labs significant for urine P/C ratio 1.07.    IV magnesium sulfate for seizure prophylaxis and fetal neuroprotection started.  Betamethasone series initiated.  IV labetalol/hydralazine protocol started.  8/15- BP stable on labetalol 400mg tid.   20-placental abruption  20-primary low transverse c/section , severe preE, placental abruption; breech  20-magnesium sulfate for 24 hrs; bp controlled with labetalol 400mg tid, procardia 30 bid  20-transferred to MBU,     Interval History:     She is doing well this morning. She is tolerating a regular diet without nausea or vomiting. She is voiding spontaneously. She is ambulating. She has passed flatus, and has not a BM. Vaginal bleeding is mild. She denies fever or chills. Abdominal pain is mild and controlled with oral medications. She is breastfeeding. Baby doing well in NICU.  Received update this AM.    Objective:     Vital Signs (Most Recent):  Temp: 98.4 °F (36.9 °C) (20 07)  Pulse: 75 (20 08)  Resp: 18 (20)  BP: (!) 151/71 (20 0828)  SpO2: 99 % (20 0604) Vital Signs (24h Range):  Temp:  [97.6 °F (36.4 °C)-98.4 °F (36.9 °C)] 98.4 °F (36.9 °C)  Pulse:  [62-75] 75  Resp:  [18] 18  BP: (120-165)/(60-79) 151/71     Weight: 79.2 kg (174 lb 9.7 oz)  Body mass index is 31.94 kg/m².    No intake or output data in the 24 hours ending 20 0925        Significant Labs:  Lab Results   Component Value Date    GROUPTRH O POS 2020    HEPBSAG Negative 2020      No results for input(s): HGB, HCT in the last 48 hours.    I have personallly reviewed all pertinent lab results from the last 24 hours.    Physical Exam:   Constitutional: She is oriented to person, place, and time. She appears well-developed and well-nourished. No distress.       Cardiovascular: Normal rate, regular rhythm and normal heart sounds.     Pulmonary/Chest: Effort normal and breath sounds normal.        Abdominal: Soft. Bowel sounds are normal. She exhibits abdominal incision (dressing dry and in place). She exhibits no distension and no mass. There is abdominal tenderness (appropriate). There is no rebound and no guarding. No hernia.     Genitourinary: Uterus is not tender. There is bleeding (lochia) in the vagina.           Musculoskeletal: Normal range of motion and moves all extremeties.       Neurological: She is alert and oriented to person, place, and time.    Skin: Skin is warm and dry.    Psychiatric: She has a normal mood and affect. Her behavior is normal. Thought content normal.       Assessment/Plan:     30 y.o. female  for:    * Status post primary low transverse  section  Pod #2 s/p emergency primary low transverse c/section at 28w1d for placental abruption, breech, severe preE  Pt is doing well.  Proceed with routine post-operative care.  Pt counseled on management plan and discharge goals.    Chronic hypertension with superimposed pre-eclampsia  Takes labetalol 100mg po BID  Admit to L&D  Magnesium sulfate therapy   BMZ series begun   IV Labetalol/hydralazine per protocol   Reviewed lab findings and diagnosis of superimposed preeclampsia with the patient via the language line.  Explained the need for inpatient management until delivery.  Deliver by 34wga or sooner if bp cannot be controlled, development of any other severe features of preeclampsia, or worsening fetal well-being.  All questions answered.    8/15/20-Pt required IV labetalol and hydralazine yesterday for  blood pressure control, but bp remained stable overnight on labetalol 400mg tid.  Pt to complete 24 hours IV magnesium sulfate and BMZ series this afternoon.  Will remain inpatient for close maternal fetal surveillance afterward.  Deliver by 34wga or sooner for worsening maternal or fetal status    08/16/20 - BP was stable overnight but increased to severe range this AM.  Improved after addition of Procardia 30 daily.  Continue with Labetalol 400 TID.  Pt again advised on treatment plan and goals.  Will consider proceeding with delivery prior to 34 WGA if BP remains difficult to control or worsens.     8/17/20 9:20 AM   Continue 24 hours of seizure prophylaxis with MgS04.   Cont aniti-HTN meds  Labetalol 400 TID and Procardia 30 BID.   8/18/20-s/p 24 hr seizure prophylaxis with magnesium sulfate (dc'd at 2300); cotintue to manage blood pressures; remains on labetalol 400mg tid and procardia 30 bid  08/19/20 - BP stable and in mild range on medications.  Doing well.        Disposition: As patient meets milestones, will plan to discharge 1-2 days.    Jordy Puente MD  Obstetrics  [facility] -

## 2020-08-19 NOTE — ASSESSMENT & PLAN NOTE
Takes labetalol 100mg po BID  Admit to L&D  Magnesium sulfate therapy   BMZ series begun   IV Labetalol/hydralazine per protocol   Reviewed lab findings and diagnosis of superimposed preeclampsia with the patient via the language line.  Explained the need for inpatient management until delivery.  Deliver by 34wga or sooner if bp cannot be controlled, development of any other severe features of preeclampsia, or worsening fetal well-being.  All questions answered.    8/15/20-Pt required IV labetalol and hydralazine yesterday for blood pressure control, but bp remained stable overnight on labetalol 400mg tid.  Pt to complete 24 hours IV magnesium sulfate and BMZ series this afternoon.  Will remain inpatient for close maternal fetal surveillance afterward.  Deliver by 34wga or sooner for worsening maternal or fetal status    08/16/20 - BP was stable overnight but increased to severe range this AM.  Improved after addition of Procardia 30 daily.  Continue with Labetalol 400 TID.  Pt again advised on treatment plan and goals.  Will consider proceeding with delivery prior to 34 WGA if BP remains difficult to control or worsens.     8/17/20 9:20 AM   Continue 24 hours of seizure prophylaxis with MgS04.   Cont aniti-HTN meds  Labetalol 400 TID and Procardia 30 BID.   8/18/20-s/p 24 hr seizure prophylaxis with magnesium sulfate (dc'd at 2300); cotintue to manage blood pressures; remains on labetalol 400mg tid and procardia 30 bid  08/19/20 - BP stable and in mild range on medications.  Doing well.

## 2020-08-19 NOTE — SUBJECTIVE & OBJECTIVE
Interval History:     She is doing well this morning. She is tolerating a regular diet without nausea or vomiting. She is voiding spontaneously. She is ambulating. She has passed flatus, and has not a BM. Vaginal bleeding is mild. She denies fever or chills. Abdominal pain is mild and controlled with oral medications. She is breastfeeding. Baby doing well in NICU.  Received update this AM.    Objective:     Vital Signs (Most Recent):  Temp: 98.4 °F (36.9 °C) (08/19/20 0741)  Pulse: 75 (08/19/20 0828)  Resp: 18 (08/19/20 0741)  BP: (!) 151/71 (08/19/20 0828)  SpO2: 99 % (08/18/20 0604) Vital Signs (24h Range):  Temp:  [97.6 °F (36.4 °C)-98.4 °F (36.9 °C)] 98.4 °F (36.9 °C)  Pulse:  [62-75] 75  Resp:  [18] 18  BP: (120-165)/(60-79) 151/71     Weight: 79.2 kg (174 lb 9.7 oz)  Body mass index is 31.94 kg/m².    No intake or output data in the 24 hours ending 08/19/20 0925        Significant Labs:  Lab Results   Component Value Date    GROUPTRH O POS 08/16/2020    HEPBSAG Negative 04/22/2020     No results for input(s): HGB, HCT in the last 48 hours.    I have personallly reviewed all pertinent lab results from the last 24 hours.    Physical Exam:   Constitutional: She is oriented to person, place, and time. She appears well-developed and well-nourished. No distress.       Cardiovascular: Normal rate, regular rhythm and normal heart sounds.     Pulmonary/Chest: Effort normal and breath sounds normal.        Abdominal: Soft. Bowel sounds are normal. She exhibits abdominal incision (dressing dry and in place). She exhibits no distension and no mass. There is abdominal tenderness (appropriate). There is no rebound and no guarding. No hernia.     Genitourinary: Uterus is not tender. There is bleeding (lochia) in the vagina.           Musculoskeletal: Normal range of motion and moves all extremeties.       Neurological: She is alert and oriented to person, place, and time.    Skin: Skin is warm and dry.    Psychiatric: She  has a normal mood and affect. Her behavior is normal. Thought content normal.

## 2020-08-19 NOTE — LACTATION NOTE
Lactation Rounds:     Visited mother at bedside. She had just pumped about 80 mL combined from both breasts at 1 PM and denied questions or concerns related to pumping. Mother reported last pumping at 9 AM and stated that she plans to pump again around 4 PM. Encouraged mother to continue pumping frequently. Bottles were labeled with date and time. Milk brought to NICU at mother's request. Lactation phone number was provided with encouragement to call with any questions or concerns or assistance with pumping.

## 2020-08-20 PROCEDURE — 25000003 PHARM REV CODE 250: Performed by: OBSTETRICS & GYNECOLOGY

## 2020-08-20 PROCEDURE — 11000001 HC ACUTE MED/SURG PRIVATE ROOM

## 2020-08-20 PROCEDURE — 99232 SBSQ HOSP IP/OBS MODERATE 35: CPT | Mod: ,,, | Performed by: OBSTETRICS & GYNECOLOGY

## 2020-08-20 PROCEDURE — 25000003 PHARM REV CODE 250: Performed by: MIDWIFE

## 2020-08-20 PROCEDURE — 99232 PR SUBSEQUENT HOSPITAL CARE,LEVL II: ICD-10-PCS | Mod: ,,, | Performed by: OBSTETRICS & GYNECOLOGY

## 2020-08-20 RX ORDER — NIFEDIPINE 30 MG/1
60 TABLET, EXTENDED RELEASE ORAL 2 TIMES DAILY
Status: DISCONTINUED | OUTPATIENT
Start: 2020-08-20 | End: 2020-08-21 | Stop reason: HOSPADM

## 2020-08-20 RX ADMIN — IBUPROFEN 800 MG: 800 TABLET, FILM COATED ORAL at 09:08

## 2020-08-20 RX ADMIN — IBUPROFEN 800 MG: 800 TABLET, FILM COATED ORAL at 02:08

## 2020-08-20 RX ADMIN — NIFEDIPINE 60 MG: 30 TABLET, FILM COATED, EXTENDED RELEASE ORAL at 09:08

## 2020-08-20 RX ADMIN — PRENATAL VITAMINS-IRON FUMARATE 27 MG IRON-FOLIC ACID 0.8 MG TABLET 1 TABLET: at 08:08

## 2020-08-20 RX ADMIN — LABETALOL HYDROCHLORIDE 400 MG: 200 TABLET, FILM COATED ORAL at 08:08

## 2020-08-20 RX ADMIN — LABETALOL HYDROCHLORIDE 400 MG: 200 TABLET, FILM COATED ORAL at 02:08

## 2020-08-20 RX ADMIN — CHLORHEXIDINE GLUCONATE 0.12% ORAL RINSE 10 ML: 1.2 LIQUID ORAL at 09:08

## 2020-08-20 RX ADMIN — DOCUSATE SODIUM 100 MG: 100 CAPSULE, LIQUID FILLED ORAL at 08:08

## 2020-08-20 RX ADMIN — LABETALOL HYDROCHLORIDE 400 MG: 200 TABLET, FILM COATED ORAL at 09:08

## 2020-08-20 RX ADMIN — NIFEDIPINE 30 MG: 30 TABLET, FILM COATED, EXTENDED RELEASE ORAL at 01:08

## 2020-08-20 RX ADMIN — NIFEDIPINE 30 MG: 30 TABLET, FILM COATED, EXTENDED RELEASE ORAL at 08:08

## 2020-08-20 RX ADMIN — IBUPROFEN 800 MG: 800 TABLET, FILM COATED ORAL at 05:08

## 2020-08-20 NOTE — NURSING
Notified Dr. Puente of increased BP of 164/91 at 0420. No new orders given at this time. Will continue to monitor.

## 2020-08-20 NOTE — ASSESSMENT & PLAN NOTE
Takes labetalol 100mg po BID  Admit to L&D  Magnesium sulfate therapy   BMZ series begun   IV Labetalol/hydralazine per protocol   Reviewed lab findings and diagnosis of superimposed preeclampsia with the patient via the language line.  Explained the need for inpatient management until delivery.  Deliver by 34wga or sooner if bp cannot be controlled, development of any other severe features of preeclampsia, or worsening fetal well-being.  All questions answered.    8/15/20-Pt required IV labetalol and hydralazine yesterday for blood pressure control, but bp remained stable overnight on labetalol 400mg tid.  Pt to complete 24 hours IV magnesium sulfate and BMZ series this afternoon.  Will remain inpatient for close maternal fetal surveillance afterward.  Deliver by 34wga or sooner for worsening maternal or fetal status    08/16/20 - BP was stable overnight but increased to severe range this AM.  Improved after addition of Procardia 30 daily.  Continue with Labetalol 400 TID.  Pt again advised on treatment plan and goals.  Will consider proceeding with delivery prior to 34 WGA if BP remains difficult to control or worsens.     8/17/20 9:20 AM   Continue 24 hours of seizure prophylaxis with MgS04.   Cont aniti-HTN meds  Labetalol 400 TID and Procardia 30 BID.   8/18/20-s/p 24 hr seizure prophylaxis with magnesium sulfate (dc'd at 2300); cotintue to manage blood pressures; remains on labetalol 400mg tid and procardia 30 bid  08/19/20 - BP stable and in mild range on medications.  Doing well.  8/20/20 overnight blood pressures close to severe range. Pt denies depression or severe anxiety. Do not suspect preeclampsia. Will increase procardia to 60 BID and will monitor one more day. Baby doing well in NICU.

## 2020-08-20 NOTE — LACTATION NOTE
Lactation Rounds:     Visited mother at bedside. Language line  #[id number] utilized throughout encounter. Mother reported pumping at 6 AM, 9 AM, and at 12 noon today. 90 mL of expressed breastmilk at bedside. Bottles were labeled by patient and brought to NICU by nurse.     Mother has [place] Breastfeeding Guide at bedside. Reviewed some basic pumping education, including cleaning of pump parts. Mother does not yet have a pump for home use. Discussed options for obtaining a pump, including insurance, WIC, or purchasing one. [employer]iel pump rental contract provided to patient for her review. Guidelines for renting a oniel pump were discussed. Mother denied questions or concerns. Lactation phone number was provided with encouragement to call as needed.

## 2020-08-20 NOTE — PLAN OF CARE
Patient progressing well as expected by now. Denied need for prn pain meds so far today. Lochia is scant. IV removed. Visited baby in NICU this morning. Increased procardia today. Will continue to monitor BP.

## 2020-08-20 NOTE — SUBJECTIVE & OBJECTIVE
Interval History:      She is doing well this morning. She is tolerating a regular diet without nausea or vomiting. She is voiding spontaneously. She is ambulating. She has passed flatus, and has a BM. Vaginal bleeding is mild. She denies fever or chills. Abdominal pain is mild and controlled with oral medications.   She desires circumcision for her male baby: not applicable.  No headache, vision change, or right upper quadrant pain.       Objective:     Vital Signs (Most Recent):  Temp: 97.6 °F (36.4 °C) (08/20/20 0035)  Pulse: 69 (08/20/20 0420)  Resp: 18 (08/20/20 0420)  BP: (!) 164/91 (08/20/20 0420)  SpO2: 99 % (08/18/20 0604) Vital Signs (24h Range):  Temp:  [97.6 °F (36.4 °C)-98.4 °F (36.9 °C)] 97.6 °F (36.4 °C)  Pulse:  [61-77] 69  Resp:  [18] 18  BP: (131-187)/(62-91) 164/91     Weight: 79.2 kg (174 lb 9.7 oz)  Body mass index is 31.94 kg/m².    No intake or output data in the 24 hours ending 08/20/20 0739        Significant Labs:  Lab Results   Component Value Date    GROUPTRH O POS 08/16/2020    HEPBSAG Negative 04/22/2020     No results for input(s): HGB, HCT in the last 48 hours.     Lab Results   Component Value Date    WBC 14.00 (H) 08/16/2020    HGB 11.2 (L) 08/16/2020    HCT 35.5 (L) 08/16/2020    MCV 93 08/16/2020     08/16/2020           I have personallly reviewed all pertinent lab results from the last 24 hours.    Physical Exam:   Constitutional: She is oriented to person, place, and time. She appears well-developed and well-nourished.    HENT:   Head: Normocephalic.     Neck: Normal range of motion. No thyromegaly present.     Pulmonary/Chest: Effort normal. Right breast exhibits no mass, no tenderness and no swelling. Left breast exhibits no mass, no tenderness and no swelling.   No breast engorgement        Abdominal: Soft. She exhibits no distension. There is no abdominal tenderness (uterine fundus firm below umbilicus and appropriately tender for post delivery).   Aquacel dressing  intact and dry     Genitourinary:    Genitourinary Comments: Lochia within normal             Musculoskeletal: Normal range of motion and moves all extremeties.      Comments: No calf tenderness or Michele's sign.       Neurological: She is alert and oriented to person, place, and time. She has normal reflexes.    Skin: Skin is warm and dry.    Psychiatric: She has a normal mood and affect.

## 2020-08-20 NOTE — PLAN OF CARE
Pt is progressing well. Pain is controlled with PO pain meds. Ambulates independently and voids without difficulty. Dressing remains dry and intact. Pumping for baby in NICU. Increased BP throughout the night. Will continue to monitor.

## 2020-08-20 NOTE — NURSING
Notified Dr. Puente of increased BP. 177/82 at 0035 and 187/91 at 0104. Orders given to give 0900 dose of Procardia now and recheck BP in an hour.

## 2020-08-20 NOTE — PROGRESS NOTES
[facility] -   Obstetrics  Postpartum Progress Note    Patient Name: Essie Bernal  MRN: [id number]  Admission Date: 2020  Hospital Length of Stay: 6 days  Attending Physician: Jordy Puente MD  Primary Care Provider: Primary Doctor No    Subjective:     Principal Problem:Status post primary low transverse  section    Hospital Course:  Upon admission to L&D, bp in the severely elevated range, patient hyperreflexic, and labs significant for urine P/C ratio 1.07.    IV magnesium sulfate for seizure prophylaxis and fetal neuroprotection started.  Betamethasone series initiated.  IV labetalol/hydralazine protocol started.  8/15- BP stable on labetalol 400mg tid.   20-placental abruption  20-primary low transverse c/section , severe preE, placental abruption; breech  20-magnesium sulfate for 24 hrs; bp controlled with labetalol 400mg tid, procardia 30 bid  20-transferred to [facility],      Interval History:      She is doing well this morning. She is tolerating a regular diet without nausea or vomiting. She is voiding spontaneously. She is ambulating. She has passed flatus, and has a BM. Vaginal bleeding is mild. She denies fever or chills. Abdominal pain is mild and controlled with oral medications.   She desires circumcision for her male baby: not applicable.  No headache, vision change, or right upper quadrant pain.       Objective:     Vital Signs (Most Recent):  Temp: 97.6 °F (36.4 °C) (20 0035)  Pulse: 69 (20 0420)  Resp: 18 (20 0420)  BP: (!) 164/91 (20 0420)  SpO2: 99 % (20 0604) Vital Signs (24h Range):  Temp:  [97.6 °F (36.4 °C)-98.4 °F (36.9 °C)] 97.6 °F (36.4 °C)  Pulse:  [61-77] 69  Resp:  [18] 18  BP: (131-187)/(62-91) 164/91     Weight: 79.2 kg (174 lb 9.7 oz)  Body mass index is 31.94 kg/m².    No intake or output data in the 24 hours ending 20 0739        Significant Labs:  Lab Results   Component Value Date    GROUPTRH  O POS 2020    HEPBSAG Negative 2020     No results for input(s): HGB, HCT in the last 48 hours.     Lab Results   Component Value Date    WBC 14.00 (H) 2020    HGB 11.2 (L) 2020    HCT 35.5 (L) 2020    MCV 93 2020     2020           I have personallly reviewed all pertinent lab results from the last 24 hours.    Physical Exam:   Constitutional: She is oriented to person, place, and time. She appears well-developed and well-nourished.    HENT:   Head: Normocephalic.     Neck: Normal range of motion. No thyromegaly present.     Pulmonary/Chest: Effort normal. Right breast exhibits no mass, no tenderness and no swelling. Left breast exhibits no mass, no tenderness and no swelling.   No breast engorgement        Abdominal: Soft. She exhibits no distension. There is no abdominal tenderness (uterine fundus firm below umbilicus and appropriately tender for post delivery).   Aquacel dressing intact and dry     Genitourinary:    Genitourinary Comments: Lochia within normal             Musculoskeletal: Normal range of motion and moves all extremeties.      Comments: No calf tenderness or Michele's sign.       Neurological: She is alert and oriented to person, place, and time. She has normal reflexes.    Skin: Skin is warm and dry.    Psychiatric: She has a normal mood and affect.       Assessment/Plan:     30 y.o. female  for:    * Status post primary low transverse  section  Pod #2 s/p emergency primary low transverse c/section at 28w1d for placental abruption, breech, severe preE  Pt is doing well.  Proceed with routine post-operative care.  Pt counseled on management plan and discharge goals.    Placenta abruption, delivered, current hospitalization       Chronic hypertension with superimposed pre-eclampsia  Takes labetalol 100mg po BID  Admit to L&D  Magnesium sulfate therapy   BMZ series begun   IV Labetalol/hydralazine per protocol   Reviewed lab findings and  diagnosis of superimposed preeclampsia with the patient via the language line.  Explained the need for inpatient management until delivery.  Deliver by 34wga or sooner if bp cannot be controlled, development of any other severe features of preeclampsia, or worsening fetal well-being.  All questions answered.    8/15/20-Pt required IV labetalol and hydralazine yesterday for blood pressure control, but bp remained stable overnight on labetalol 400mg tid.  Pt to complete 24 hours IV magnesium sulfate and BMZ series this afternoon.  Will remain inpatient for close maternal fetal surveillance afterward.  Deliver by 34wga or sooner for worsening maternal or fetal status    08/16/20 - BP was stable overnight but increased to severe range this AM.  Improved after addition of Procardia 30 daily.  Continue with Labetalol 400 TID.  Pt again advised on treatment plan and goals.  Will consider proceeding with delivery prior to 34 WGA if BP remains difficult to control or worsens.     8/17/20 9:20 AM   Continue 24 hours of seizure prophylaxis with MgS04.   Cont aniti-HTN meds  Labetalol 400 TID and Procardia 30 BID.   8/18/20-s/p 24 hr seizure prophylaxis with magnesium sulfate (dc'd at 2300); cotintue to manage blood pressures; remains on labetalol 400mg tid and procardia 30 bid  08/19/20 - BP stable and in mild range on medications.  Doing well.  8/20/20 overnight blood pressures close to severe range. Pt denies depression or severe anxiety. Do not suspect preeclampsia. Will increase procardia to 60 BID and will monitor one more day. Baby doing well in NICU.        Disposition: As patient meets milestones, will plan to discharge  .    Casi Macias MD  Obstetrics  [facility]

## 2020-08-21 ENCOUNTER — TELEPHONE (OUTPATIENT)
Dept: PHARMACY | Facility: CLINIC | Age: 30
End: 2020-08-21

## 2020-08-21 VITALS
WEIGHT: 174.63 LBS | HEART RATE: 74 BPM | TEMPERATURE: 98 F | OXYGEN SATURATION: 98 % | SYSTOLIC BLOOD PRESSURE: 135 MMHG | BODY MASS INDEX: 32.14 KG/M2 | HEIGHT: 62 IN | RESPIRATION RATE: 19 BRPM | DIASTOLIC BLOOD PRESSURE: 80 MMHG

## 2020-08-21 PROCEDURE — 99238 HOSP IP/OBS DSCHRG MGMT 30/<: CPT | Mod: ,,, | Performed by: PHYSICIAN ASSISTANT

## 2020-08-21 PROCEDURE — 63600175 PHARM REV CODE 636 W HCPCS: Mod: SL | Performed by: OBSTETRICS & GYNECOLOGY

## 2020-08-21 PROCEDURE — 25000003 PHARM REV CODE 250: Performed by: OBSTETRICS & GYNECOLOGY

## 2020-08-21 PROCEDURE — 90471 IMMUNIZATION ADMIN: CPT | Mod: VFC | Performed by: OBSTETRICS & GYNECOLOGY

## 2020-08-21 PROCEDURE — 99238 PR HOSPITAL DISCHARGE DAY,<30 MIN: ICD-10-PCS | Mod: ,,, | Performed by: PHYSICIAN ASSISTANT

## 2020-08-21 PROCEDURE — 90715 TDAP VACCINE 7 YRS/> IM: CPT | Mod: SL | Performed by: OBSTETRICS & GYNECOLOGY

## 2020-08-21 RX ORDER — HYDROCODONE BITARTRATE AND ACETAMINOPHEN 5; 325 MG/1; MG/1
1 TABLET ORAL EVERY 4 HOURS PRN
Qty: 15 TABLET | Refills: 0 | Status: SHIPPED | OUTPATIENT
Start: 2020-08-21

## 2020-08-21 RX ORDER — IBUPROFEN 800 MG/1
800 TABLET ORAL EVERY 8 HOURS
Qty: 30 TABLET | Refills: 0 | Status: SHIPPED | OUTPATIENT
Start: 2020-08-21

## 2020-08-21 RX ORDER — LABETALOL 200 MG/1
400 TABLET, FILM COATED ORAL 3 TIMES DAILY
Qty: 180 TABLET | Refills: 1 | Status: SHIPPED | OUTPATIENT
Start: 2020-08-21 | End: 2021-08-21

## 2020-08-21 RX ORDER — NIFEDIPINE 60 MG/1
60 TABLET, EXTENDED RELEASE ORAL 2 TIMES DAILY
Qty: 60 TABLET | Refills: 1 | Status: SHIPPED | OUTPATIENT
Start: 2020-08-21 | End: 2020-08-25

## 2020-08-21 RX ADMIN — NIFEDIPINE 60 MG: 30 TABLET, FILM COATED, EXTENDED RELEASE ORAL at 09:08

## 2020-08-21 RX ADMIN — IBUPROFEN 800 MG: 800 TABLET, FILM COATED ORAL at 05:08

## 2020-08-21 RX ADMIN — CLOSTRIDIUM TETANI TOXOID ANTIGEN (FORMALDEHYDE INACTIVATED), CORYNEBACTERIUM DIPHTHERIAE TOXOID ANTIGEN (FORMALDEHYDE INACTIVATED), BORDETELLA PERTUSSIS TOXOID ANTIGEN (GLUTARALDEHYDE INACTIVATED), BORDETELLA PERTUSSIS FILAMENTOUS HEMAGGLUTININ ANTIGEN (FORMALDEHYDE INACTIVATED), BORDETELLA PERTUSSIS PERTACTIN ANTIGEN, AND BORDETELLA PERTUSSIS FIMBRIAE 2/3 ANTIGEN 0.5 ML: 5; 2; 2.5; 5; 3; 5 INJECTION, SUSPENSION INTRAMUSCULAR at 01:08

## 2020-08-21 RX ADMIN — PRENATAL VITAMINS-IRON FUMARATE 27 MG IRON-FOLIC ACID 0.8 MG TABLET 1 TABLET: at 09:08

## 2020-08-21 RX ADMIN — DOCUSATE SODIUM 100 MG: 100 CAPSULE, LIQUID FILLED ORAL at 09:08

## 2020-08-21 RX ADMIN — LABETALOL HYDROCHLORIDE 400 MG: 200 TABLET, FILM COATED ORAL at 09:08

## 2020-08-21 NOTE — LACTATION NOTE
"Lactation Rounds.    Spoke with mother via language line.  She reports that she does not have a breast pump at home and will require a oniel pump to provide milk for her NICU baby.    Reviewed oniel pump form with patient while on the phone with language line.   "Hermelindo" reports that mother verbalizes her understanding.      Oniel pump arranged and advised mother to keep all of her pumping kit to use at home. She has no questions or concerns at this time.    "

## 2020-08-21 NOTE — DISCHARGE INSTRUCTIONS
"Madre Autocuidado:    Actividad: evitar el ejercicio vigoroso y obtener un descanso adecuado. No se puede conducir hasta que el médico consentimiento prestado.  Los cambios emocionales: La mayoría de las mujeres encuentran a luis f a ser un momento de gran agitación emocional. Sensación de pérdida, cambios de humor, fatiga, ansiedad y sensación de "decepción" son comunes. Si los sentimientos empeoran o fair más de duglas semana, llame a godwin médico.  Cuidado de las mamas / La lactancia materna: Use un sostén para duglas mayor comodidad. Mantenga pezones secos y aplicar godwin propia leche materna o lanolina crema según sea necesario para el dolor. La congestión mamaria puede aliviar con calor húmedo antes de las comidas. También puede rox ibuprofeno.  Cuidado de las mamas / botella de alimentación: Use sostén de soporte 24 horas al día pauly duglas semana. Evitar la estimulación de los pechos. Puede usar bolsas de hielo para aliviar las molestias.  La hemorragia rekha-Care / vaginal: Recuerde usar godwin rekha-botella después de orinar. Godwin flujo cambiará de mcguire, de color oscar, el color amarillo / hicks pauly un período de 2 semanas. La menstruación volverá en 3-8 semanas, o más tiempo si la lactancia materna.  Entrega episiotomía vaginal: Las puntadas se disolverán dentro de los 10 días a 3 semanas. Los lj calientes, alforzas y Dermoplast promoverán la curación. Evitar los lj de burbujas o jabones keenan.  Cesárea / ligadura de trompas: Mantenga la incisión limpia y seca. Por favor, elimine tiras estériles en 5-7 días. Puede ducharse, radha evitar los lj.  Actividad sexual / pélvica Konstantin: Sin actividad sexual, los tampones, duchas vaginales o hasta que el médico le da godwin consentimiento.  Dieta: Continuar para comer de los monico grupos de alimentos básicos, incluyendo un montón de proteínas, frutas, verduras y granos enteros. Limitar las calorías vacías y alimentos con alto contenido de grasa. [place] suficiente líquido " "para calmar la sed y añadir un extra de 500 calorías para la lactancia.  Estreñimiento / Hemorroides: [place] mucha agua. Usted puede rox un ablandador fecal o laxante natural (Metamucil). Es posible utilizar alforzas o pomada hemorroides y sumergirse en duglas nguyen con agua tibia.    LLAMAR A YOON MÉDICO OB si alguna de las siguientes situaciones:  * El sangrado abundante - duglas compresa higiénica que pasa duglas hora o cualquier grandes (2-3 pulgadas de tamaño) los coágulos de shawna.  * Cualquier dolor, enrojecimiento o sensibilidad en la pierna inferior.  * No puede cuidar de sí mismo o de yoon bebé.  * Cualquier signo de infección-      - temperatura de más de 100.5 grados F      - Falta de flujo vaginal con olor y / o drenaje de la incisión      - Aumento de la episiotomía o dolor en la incisión      - Grayson, dura triston, enrojecimiento o dolor en el pecho      - Síntomas parecidos a la gripe      - Cualquier urgencia, frecuencia o ardor al orinar      Mother Self Care:    Activity: Avoid strenuous exercise and get adequate rest.  No driving until the physician consent given.  Emotional Changes: Most women find birth to be a time of great emotional upheaval.  Sense of loss, mood swings, fatigue, anxiety, and feeling "let down" are common.  If feelings worsen or last more than a week, call your physician.  Breast Care/Breastfeeding: Wear a bra for comfort.  Keep nipples dry and apply your own breast milk or lanolin cream as needed for soreness.  Engorgement can be relieved with warm, moist heat before feedings.  You may also take Ibuprofen.  Breast Care/Bottle Feeding: Wear support bra 24 hours a day for one week.  Avoid stimulation to breasts.  You may use ice packs for discomfort.  Johanne-Care/Vaginal Bleeding: Remember to use your johanne-bottle after urinating.  Your flow will change from red, to pink, to yellow/white color over a period of 2 weeks.  Menstruation will return in 3-8 weeks, or longer if " breastfeeding.  Episiotomy Vaginal Delivery: Stitches will dissolve within 10 days to 3 weeks.  Warm baths, tucks, and dermoplast will promote healing.  Avoid bubble baths or strong soaps.   Section/Tubal Ligation: Keep incision clean and dry. You may shower, but avoid baths.  Sexual Activity/Pelvic Rest: No sexual activity, tampons, or douching until your physician gives you consent.  Diet: Continue to eat from the five basic food groups, including plenty of protein, fruits, vegetables, and whole grains.  Limit empty calories and high fat foods.  Drink enough fluids to satisfy thirst and add an extra 500 calories for breastfeeding.  Constipation/Hemorrhoids: Drink plenty of water.  You may take a stool softener or natural laxative (Metamucil). You may use tucks or hemorrhoid ointment and soak in a warm tub.    CALL YOUR OB DOCTOR IF ANY OF THE FOLLOWING OCCURS:  *Heavy bleeding - saturating a pad an hour or passing any large (2-3 inches in size) blood clots.  *Any pain, redness, or tenderness in lower leg.  *You cannot care for yourself or your baby.  *Any signs of infection-      - Temperature greater than 100.5 degrees F      - Foul smelling vaginal discharge and/or incisional drainage      - Increased episiotomy or incisional pain      - Hot, hard, red or sore area on breast      - Flu-like symptoms      - Any urgency, frequency or burning with urination    Return To the Hospital for further Evaluation:  · Headache not relieved by tylenol or ibuprofen  · Blurry vision, double vision, seeing spots, or flashing lights  · Feeling faint or passing out  · Right epigastric pain  · Difficulty breathing  · Swelling in hands, face, or feet  · Any of these symptoms accompanied by nausea/vomiting  · Gaining more than 5 pounds in one week  · Seizures  These symptoms could be an indication of elevated blood pressure.       If you have any questions that need to be answered immediately please call the Labor & Delivery  Unit at [phone] and ask to speak to a nurse.

## 2020-08-21 NOTE — DISCHARGE SUMMARY
[facility] -   Obstetrics  Discharge Summary      Patient Name: Essie Bernal  MRN: [id number]  Admission Date: 2020  Hospital Length of Stay: 7 days  Discharge Date and Time:  2020 8:49 AM  Attending Physician: Jordy Puente MD   Discharging Provider: Sumaya Uribe PA-C   Primary Care Provider: Primary Doctor No    HPI: Sent from office due to increased blood pressures, hx of pre-eclampsia with PTB, baby breech on today's ultrasound          Procedure(s) (LRB):   SECTION (N/A)     Hospital Course:   Upon admission to L&D, bp in the severely elevated range, patient hyperreflexic, and labs significant for urine P/C ratio 1.07.    IV magnesium sulfate for seizure prophylaxis and fetal neuroprotection started.  Betamethasone series initiated.  IV labetalol/hydralazine protocol started.  8/15- BP stable on labetalol 400mg tid.   20-placental abruption  20-primary low transverse c/section , severe preE, placental abruption; breech  20-magnesium sulfate for 24 hrs; bp controlled with labetalol 400mg tid, procardia 30 bid  20-transferred to [facility]  Patient was monitored closely for BP control with adjustments as needed, will be discharge POD4 on labetalol 400 mg TID and Procardia XL 60 mg BID         Final Active Diagnoses:    Diagnosis Date Noted POA    PRINCIPAL PROBLEM:  Status post primary low transverse  section [Z98.891] 2020 Not Applicable    Placenta abruption, delivered, current hospitalization [O45.90] 2020 No    Chronic hypertension with superimposed pre-eclampsia [O11.9] 2020 Yes      Problems Resolved During this Admission:    Diagnosis Date Noted Date Resolved POA    Breech presentation of fetus [O32.1XX0] 2020 Yes    History of  delivery, currently pregnant in second trimester [O09.212] 2020 Not Applicable        Significant Diagnostic Studies: Labs: All labs within the past  24 hours have been reviewed      Feeding Method: pumping for infant in NICU    Immunizations     None          Delivery:    Episiotomy: None   Lacerations: None   Repair suture:     Repair # of packets:     Blood loss (ml): 0     Birth information:  YOB: 2021   Time of birth: 11:44 PM   Sex: female   Delivery type: , Low Transverse   Gestational Age: 28w1d    Delivery Clinician:      Other providers:       Additional  information:  Forceps:    Vacuum:    Breech:    Observed anomalies      Living?:           APGARS  One minute Five minutes Ten minutes   Skin color:         Heart rate:         Grimace:         Muscle tone:         Breathing:         Totals: 1  4  7      Placenta: Delivered:       appearance    Pending Diagnostic Studies:     None          Discharged Condition: good    Disposition: Home or Self Care    Follow Up:  Follow-up Information     [facility] In 1 week.    Why: Bandage removal and BP check           Jordy Puente MD In 4 weeks.    Specialty: Obstetrics and Gynecology  Why: Post-operative visit  Contact information:  [id number]  [place] [address] [phone]                 Patient Instructions:      Diet Adult Regular     Other restrictions (specify):   Order Comments: Pelvic rest x 6 weeks (no tampons, intercourse douching); showers only for 6 wks; no lifting more than baby     Call MD for:  temperature >100.4     Call MD for:  severe uncontrolled pain     Call MD for:  redness, tenderness, or signs of infection (pain, swelling, redness, odor or green/yellow discharge around incision site)     Call MD for:  difficulty breathing or increased cough     Call MD for:  severe persistent headache     Call MD for:  persistent dizziness, light-headedness, or visual disturbances     Leave dressing on - Keep it clean, dry, and intact until clinic visit   Order Comments: Dressing will be removed at 1 week nurse visit.  Showers only- no baths for 6 weeks.      Medications:  Current Discharge Medication List      START taking these medications    Details   HYDROcodone-acetaminophen (NORCO) 5-325 mg per tablet Take 1 tablet by mouth every 4 (four) hours as needed for Pain.  Qty: 15 tablet, Refills: 0    Comments: Quantity prescribed more than 7 day supply? No  Associated Diagnoses: Status post primary low transverse  section      ibuprofen (ADVIL,MOTRIN) 800 MG tablet Take 1 tablet (800 mg total) by mouth every 8 (eight) hours.  Qty: 30 tablet, Refills: 0    Associated Diagnoses: Status post primary low transverse  section      NIFEdipine (PROCARDIA-XL) 60 MG (OSM) 24 hr tablet Take 1 tablet (60 mg total) by mouth 2 (two) times a day.  Qty: 60 tablet, Refills: 1    Comments: .         CONTINUE these medications which have CHANGED    Details   labetaloL (NORMODYNE) 200 MG tablet Take 2 tablets (400 mg total) by mouth 3 (three) times daily.  Qty: 180 tablet, Refills: 1    Comments: .         CONTINUE these medications which have NOT CHANGED    Details   PRENATAL 28 mg iron- 800 mcg Tab TK 1 T PO  D             Sumaya Uribe PA-C  Obstetrics  [facility]

## 2020-08-21 NOTE — PROGRESS NOTES
Discharge education and follow-up instructions given using . Discussed medications, what they are for and when to take them. Verbalized understanding. All medications delivered by [facility]. There was a delay in getting her procardia approved but this was able to be accomplished before patient left. Transported to car per wheelchair. Family with patient. NAD, VSS.

## 2020-08-21 NOTE — PROGRESS NOTES
[facility] -   Obstetrics  Postpartum Progress Note    Patient Name: Essie Bernal  MRN: [id number]  Admission Date: 2020  Hospital Length of Stay: 7 days  Attending Physician: Jordy Puente MD  Primary Care Provider: Primary Doctor No    Subjective:     Principal Problem:Status post primary low transverse  section    Hospital Course:  Upon admission to L&D, bp in the severely elevated range, patient hyperreflexic, and labs significant for urine P/C ratio 1.07.    IV magnesium sulfate for seizure prophylaxis and fetal neuroprotection started.  Betamethasone series initiated.  IV labetalol/hydralazine protocol started.  8/15- BP stable on labetalol 400mg tid.   20-placental abruption  20-primary low transverse c/section , severe preE, placental abruption; breech  20-magnesium sulfate for 24 hrs; bp controlled with labetalol 400mg tid, procardia 30 bid  20-transferred to MBU  Patient was monitored closely for BP control with adjustments as needed, will be discharge POD4 on labetalol 400 mg TID and Procardia XL 60 mg BID    Interval History:     She is doing well this morning. Denies HA or vision changes. She is tolerating a regular diet without nausea or vomiting. She is voiding spontaneously. She is ambulating. She has passed flatus, and has not a BM. Vaginal bleeding is mild. She denies fever or chills. Abdominal pain is mild and controlled with oral medications. She is pumping for infant in NICU.     Objective:     Vital Signs (Most Recent):  Temp: 97.2 °F (36.2 °C) (20 0838)  Pulse: 69 (20 0838)  Resp: 20 (20 0838)  BP: 133/72 (20 0838)  SpO2: 98 % (20 0838) Vital Signs (24h Range):  Temp:  [97.2 °F (36.2 °C)-99.4 °F (37.4 °C)] 97.2 °F (36.2 °C)  Pulse:  [66-76] 69  Resp:  [17-20] 20  SpO2:  [97 %-98 %] 98 %  BP: (120-150)/(69-80) 133/72     Weight: 79.2 kg (174 lb 9.7 oz)  Body mass index is 31.94 kg/m².    No intake or output  data in the 24 hours ending 20 0844        Significant Labs:  Lab Results   Component Value Date    GROUPTRH O POS 2020    HEPBSAG Negative 2020     No results for input(s): HGB, HCT in the last 48 hours.    I have personallly reviewed all pertinent lab results from the last 24 hours.    Physical Exam:   Constitutional: She is oriented to person, place, and time. She appears well-developed and well-nourished. No distress.       Cardiovascular: Normal rate, regular rhythm, normal heart sounds and intact distal pulses.    No murmur heard.   Pulmonary/Chest: Effort normal and breath sounds normal. No respiratory distress. She has no wheezes. She has no rales.        Abdominal: Soft. Bowel sounds are normal. She exhibits abdominal incision (Aquacel dressing in place, clear/dry/intact). She exhibits no distension. There is no abdominal tenderness. There is no guarding.   Uterine fundus firm, below umbilicus, mild tenderness (appropriate)             Musculoskeletal: Moves all extremeties. Edema (trace BLE) present.      Comments: No calf tenderness       Neurological: She is alert and oriented to person, place, and time. She has normal reflexes.    Skin: Skin is warm and dry. No rash noted. She is not diaphoretic.        Assessment/Plan:     30 y.o. female  for:    * Status post primary low transverse  section  Pod #4 s/p emergency primary low transverse c/section at 28w1d for placental abruption, breech, severe preE  Pt is doing well.  Proceed with routine post-operative care.  Pt counseled on management plan and discharge goals. Plan for discharge home today    Placenta abruption, delivered, current hospitalization       Chronic hypertension with superimposed pre-eclampsia  Takes labetalol 100mg po BID  Admit to L&D  Magnesium sulfate therapy   BMZ series begun   IV Labetalol/hydralazine per protocol   Reviewed lab findings and diagnosis of superimposed preeclampsia with the patient via  the language line.  Explained the need for inpatient management until delivery.  Deliver by 34wga or sooner if bp cannot be controlled, development of any other severe features of preeclampsia, or worsening fetal well-being.  All questions answered.    8/15/20-Pt required IV labetalol and hydralazine yesterday for blood pressure control, but bp remained stable overnight on labetalol 400mg tid.  Pt to complete 24 hours IV magnesium sulfate and BMZ series this afternoon.  Will remain inpatient for close maternal fetal surveillance afterward.  Deliver by 34wga or sooner for worsening maternal or fetal status    08/16/20 - BP was stable overnight but increased to severe range this AM.  Improved after addition of Procardia 30 daily.  Continue with Labetalol 400 TID.  Pt again advised on treatment plan and goals.  Will consider proceeding with delivery prior to 34 WGA if BP remains difficult to control or worsens.     8/17/20 9:20 AM   Continue 24 hours of seizure prophylaxis with MgS04.   Cont aniti-HTN meds  Labetalol 400 TID and Procardia 30 BID.   8/18/20-s/p 24 hr seizure prophylaxis with magnesium sulfate (dc'd at 2300); cotintue to manage blood pressures; remains on labetalol 400mg tid and procardia 30 bid  08/19/20 - BP stable and in mild range on medications.  Doing well.  8/20/20 overnight blood pressures close to severe range. Pt denies depression or severe anxiety. Do not suspect preeclampsia. Will increase procardia to 60 BID and will monitor one more day. Baby doing well in NICU.  8/21-Improved BP control on labetalol 400 mg TID and Procardia XL 60 mg BID, will discharge home with close outpatient follow up for BP control        Disposition: Will plan to discharge today.    Sumaya Uribe PA-C  Obstetrics  [facility]

## 2020-08-21 NOTE — SUBJECTIVE & OBJECTIVE
Interval History:     She is doing well this morning. Denies HA or vision changes. She is tolerating a regular diet without nausea or vomiting. She is voiding spontaneously. She is ambulating. She has passed flatus, and has not a BM. Vaginal bleeding is mild. She denies fever or chills. Abdominal pain is mild and controlled with oral medications. She is pumping for infant in NICU.     Objective:     Vital Signs (Most Recent):  Temp: 97.2 °F (36.2 °C) (08/21/20 0838)  Pulse: 69 (08/21/20 0838)  Resp: 20 (08/21/20 0838)  BP: 133/72 (08/21/20 0838)  SpO2: 98 % (08/21/20 0838) Vital Signs (24h Range):  Temp:  [97.2 °F (36.2 °C)-99.4 °F (37.4 °C)] 97.2 °F (36.2 °C)  Pulse:  [66-76] 69  Resp:  [17-20] 20  SpO2:  [97 %-98 %] 98 %  BP: (120-150)/(69-80) 133/72     Weight: 79.2 kg (174 lb 9.7 oz)  Body mass index is 31.94 kg/m².    No intake or output data in the 24 hours ending 08/21/20 0844        Significant Labs:  Lab Results   Component Value Date    GROUPTRH O POS 08/16/2020    HEPBSAG Negative 04/22/2020     No results for input(s): HGB, HCT in the last 48 hours.    I have personallly reviewed all pertinent lab results from the last 24 hours.    Physical Exam:   Constitutional: She is oriented to person, place, and time. She appears well-developed and well-nourished. No distress.       Cardiovascular: Normal rate, regular rhythm, normal heart sounds and intact distal pulses.    No murmur heard.   Pulmonary/Chest: Effort normal and breath sounds normal. No respiratory distress. She has no wheezes. She has no rales.        Abdominal: Soft. Bowel sounds are normal. She exhibits abdominal incision (Aquacel dressing in place, clear/dry/intact). She exhibits no distension. There is no abdominal tenderness. There is no guarding.   Uterine fundus firm, below umbilicus, mild tenderness (appropriate)             Musculoskeletal: Moves all extremeties. Edema (trace BLE) present.      Comments: No calf tenderness        Neurological: She is alert and oriented to person, place, and time. She has normal reflexes.    Skin: Skin is warm and dry. No rash noted. She is not diaphoretic.

## 2020-08-21 NOTE — ASSESSMENT & PLAN NOTE
Pod #4 s/p emergency primary low transverse c/section at 28w1d for placental abruption, breech, severe preE  Pt is doing well.  Proceed with routine post-operative care.  Pt counseled on management plan and discharge goals. Plan for discharge home today

## 2020-08-21 NOTE — TELEPHONE ENCOUNTER
Good Afternoon,     The prior authorization for Essie Bernal's Nifedipine 60mg 24hr tablet (Bid Dosing) prescription has been APPROVED FROM 8/21/2020 TO 8/21/2021 with copayment of $0.00.       Patient is a bedside pt and [facility] will be delivering medication to patient.    If there are any additional questions or concerns, please contact me.    Thank You!   Hilaria Brown CPhT, B.A  Patient Care Advocate   [facility]  Phone: [phone] Ext [phone]  Fax: [phone]

## 2020-08-24 NOTE — PROGRESS NOTES
Subjective:       Patient ID: Essie Bernal is a 30 y.o. female.    Chief Complaint:  Postpartum Care      History of Present Illness  HPI  Postoperative Follow-up  Patient presents to the clinic 1 weeks status post  for placental abruption, CHTN with superimposed severe preE. Eating a regular diet without difficulty. Bowel movements are normal. Pain is controlled with current analgesics. Medications being used: ibuprofen (OTC) and norco.  Denies HA, changes to her vision, RUQ pain.      GYN & OB History  Patient's last menstrual period was 2020.   Date of Last Pap: No result found    OB History    Para Term  AB Living   5 5 3 2   5   SAB TAB Ectopic Multiple Live Births         0 5      # Outcome Date GA Lbr Frank/2nd Weight Sex Delivery Anes PTL Lv   5  20 28w1d  0.99 kg (2 lb 2.9 oz) F CS-LTranv Gen N MARI      Complications: Abruptio Placenta   4  08/29/15   0.907 kg (2 lb) M Vag-Spont  N MARI      Birth Comments: Induced early secondary to Severe Pre-eclampsia      Complications: Preeclampsia   3 Term 13   3.26 kg (7 lb 3 oz) M Vag-Spont  N MARI   2 Term 11   3.175 kg (7 lb) M Vag-Spont  N MARI      Complications: HTN in pregnancy, chronic   1 Term 06   2.948 kg (6 lb 8 oz) M Vag-Spont  N MARI       Review of Systems  Review of Systems   Constitutional: Negative for activity change, chills, fatigue and fever.   Respiratory: Negative for cough.    Cardiovascular: Negative for chest pain and leg swelling.   Gastrointestinal: Negative for abdominal pain, constipation, nausea and vomiting.   Genitourinary: Negative for dysuria, frequency, hematuria, pelvic pain, urgency, vaginal bleeding and vaginal discharge.   Integumentary:  Negative for rash.           Objective:    Physical Exam:   Constitutional: She is oriented to person, place, and time. She appears well-developed and well-nourished. No distress.       Cardiovascular: Normal rate.      Pulmonary/Chest: Effort normal. No respiratory distress.        Abdominal: Soft. She exhibits abdominal incision (Aquacel dressing in place, intact, old blood to right aspect of incision no active drainage, healing well and intact). She exhibits no distension. There is no abdominal tenderness. There is no guarding.             Musculoskeletal: Moves all extremeties. No edema.      Comments: No calf tenderness       Neurological: She is alert and oriented to person, place, and time.    Skin: Skin is warm and dry. No rash noted. She is not diaphoretic.           Problem List Items Addressed This Visit        Obstetric    Status post primary low transverse  section - Primary      BP today is 102/78  Continue Procardia but will reduce to daily; continue labetalol 200 mg TID    Patient requests Nexplanon, reviewed with patient and she signed form  Patient doing well post op  Patient is scheduled for postpartum visit with Dr Puente.    Reviewed all restrictions & limitations with the patient. Advised to call clinic in event of fever, redness or drainage around the incision, worsening pain, or any any concerning issues or symptoms.  Instructed the importance of showering daily, no tub baths, using an antibacterial soap.  Patient verbalized understanding.

## 2020-08-25 ENCOUNTER — POSTPARTUM VISIT (OUTPATIENT)
Dept: OBSTETRICS AND GYNECOLOGY | Facility: CLINIC | Age: 30
End: 2020-08-25
Payer: MEDICAID

## 2020-08-25 VITALS
HEIGHT: 62 IN | SYSTOLIC BLOOD PRESSURE: 102 MMHG | BODY MASS INDEX: 30.51 KG/M2 | WEIGHT: 165.81 LBS | DIASTOLIC BLOOD PRESSURE: 78 MMHG

## 2020-08-25 DIAGNOSIS — Z98.891 STATUS POST PRIMARY LOW TRANSVERSE CESAREAN SECTION: Primary | ICD-10-CM

## 2020-08-25 PROCEDURE — 99999 PR PBB SHADOW E&M-EST. PATIENT-LVL III: ICD-10-PCS | Mod: PBBFAC,,,

## 2020-08-25 PROCEDURE — 59430 PR CARE AFTER DELIVERY ONLY: ICD-10-PCS | Mod: ,,, | Performed by: OBSTETRICS & GYNECOLOGY

## 2020-08-25 PROCEDURE — 99999 PR PBB SHADOW E&M-EST. PATIENT-LVL III: CPT | Mod: PBBFAC,,,

## 2020-08-25 PROCEDURE — 99213 OFFICE O/P EST LOW 20 MIN: CPT | Mod: PBBFAC

## 2020-08-25 RX ORDER — NIFEDIPINE 60 MG/1
60 TABLET, EXTENDED RELEASE ORAL DAILY
Qty: 60 TABLET | Refills: 1
Start: 2020-08-25 | End: 2021-08-25

## 2020-09-09 NOTE — PHYSICIAN QUERY
PT Name: Essie Bernal  MR #: [id number]     HYPERTENSIVE CONDITION CLARIFICATION     CDS/: MARCELLO Zepeda,RNC-MNN        Contact information:[email or web address]  This form is a permanent document in the medical record.     Query Date: September 9, 2020    By submitting this query, we are merely seeking further clarification of documentation to reflect the severity of illness of your patient. Please utilize your independent clinical judgment when addressing the question(s) below.    The medical record reflects the following:     Indicators   Supporting Clinical Findings Location in Medical Record   X Hypertension or hypertensive documented Chronic hypertension with superimposed pre-eclampsia OB Progress note 8/14@342pm   X Chronic condition(s) This pregnancy has been complicated by   Hx pre-eclampsia with PTB  Breech presentation of fetus on ultrasound today in clinic  CHTN, labetalol 100mg po BID H&P 8/14   X Vital signs 's/80's      JQ=820/100, 166/92, 165/79, 187/91 OB Progress note 8/16@1122pm    Flowsheet 8/17-8/20   X Lab findings labs significant for urine P/C ratio 1.07.  OB Progress note 8/14@342pm    Radiology findings     X Treatment/Medication IV magnesium sulfate for seizure prophylaxis and fetal neuroprotection started.  Betamethasone series initiated.  IV labetalol/hydralazine protocol started    8/15/20-Pt required IV labetalol and hydralazine yesterday for blood pressure control, but bp remained stable overnight on labetalol 400mg tid.  Pt to complete 24 hours IV magnesium sulfate and BMZ series this afternoon OB Progress note 8/14@342pm              OB Progress note 8/15@1221pm    Other       Provider, please specify the diagnosis or diagnoses that correspond(s) to the above indicators:    [   ] Hypertensive crisis, unspecified   [   ] Hypertensive emergency    [   ] Hypertensive urgency   [x   ] Other cardiovascular condition (please specify): ___please send to  dr rodriguez for clarification_______   [   ]  Clinically Undetermined     Present on admission (POA) status:   [   ] Yes (Y)                          [   ] Clinically Undetermined (W)       [   ] No (N)                            [   ] Documentation insufficient to determine if condition is POA (U)       Please document in your progress notes daily for the duration of treatment until resolved, and include in your discharge summary.

## 2020-09-10 NOTE — PHYSICIAN QUERY
PT Name: Essie Bernal  MR #: [id number]     HYPERTENSIVE CONDITION CLARIFICATION     CDS/: MARCELLO Zepeda,RNC-MNN        Contact information:[email or web address]  This form is a permanent document in the medical record.     Query Date: September 10, 2020    By submitting this query, we are merely seeking further clarification of documentation to reflect the severity of illness of your patient. Please utilize your independent clinical judgment when addressing the question(s) below.    The medical record reflects the following:     Indicators   Supporting Clinical Findings Location in Medical Record   X Hypertension or hypertensive documented Chronic hypertension with superimposed pre-eclampsia OB Progress note 8/14@342pm   X Chronic condition(s) This pregnancy has been complicated by   Hx pre-eclampsia with PTB  Breech presentation of fetus on ultrasound today in clinic  CHTN, labetalol 100mg po BID H&P 8/14   X Vital signs 's/80's      SK=875/100, 166/92, 165/79, 187/91 OB Progress note 8/16@1122pm    Flowsheet 8/17-8/20   X Lab findings labs significant for urine P/C ratio 1.07.  OB Progress note 8/14@342pm    Radiology findings     X Treatment/Medication IV magnesium sulfate for seizure prophylaxis and fetal neuroprotection started.  Betamethasone series initiated.  IV labetalol/hydralazine protocol started    8/15/20-Pt required IV labetalol and hydralazine yesterday for blood pressure control, but bp remained stable overnight on labetalol 400mg tid.  Pt to complete 24 hours IV magnesium sulfate and BMZ series this afternoon OB Progress note 8/14@342pm              OB Progress note 8/15@1221pm    Other       Provider, please specify the diagnosis or diagnoses that correspond(s) to the above indicators:    [   ] Hypertensive crisis, unspecified   [   ] Hypertensive emergency    [   ] Hypertensive urgency   [ X  ] Other cardiovascular condition (please specify): Chronic HTN with  super-imposed preeclampsia__________   [   ]  Clinically Undetermined     Present on admission (POA) status:   [  X ] Yes (Y)                          [   ] Clinically Undetermined (W)       [   ] No (N)                            [   ] Documentation insufficient to determine if condition is POA (U)       Please document in your progress notes daily for the duration of treatment until resolved, and include in your discharge summary.

## 2020-09-16 ENCOUNTER — POSTPARTUM VISIT (OUTPATIENT)
Dept: OBSTETRICS AND GYNECOLOGY | Facility: CLINIC | Age: 30
End: 2020-09-16
Payer: MEDICAID

## 2020-09-16 VITALS
DIASTOLIC BLOOD PRESSURE: 90 MMHG | BODY MASS INDEX: 30.39 KG/M2 | WEIGHT: 165.13 LBS | SYSTOLIC BLOOD PRESSURE: 140 MMHG | HEIGHT: 62 IN

## 2020-09-16 DIAGNOSIS — O11.9 CHRONIC HYPERTENSION WITH SUPERIMPOSED PRE-ECLAMPSIA: ICD-10-CM

## 2020-09-16 DIAGNOSIS — Z98.891 STATUS POST PRIMARY LOW TRANSVERSE CESAREAN SECTION: Primary | ICD-10-CM

## 2020-09-16 PROBLEM — Z87.59 HISTORY OF SEVERE PRE-ECLAMPSIA: Status: RESOLVED | Noted: 2020-06-12 | Resolved: 2020-09-16

## 2020-09-16 PROBLEM — O45.90 PLACENTA ABRUPTION, DELIVERED, CURRENT HOSPITALIZATION: Status: RESOLVED | Noted: 2020-08-17 | Resolved: 2020-09-16

## 2020-09-16 PROCEDURE — 99213 OFFICE O/P EST LOW 20 MIN: CPT | Mod: PBBFAC,TH | Performed by: OBSTETRICS & GYNECOLOGY

## 2020-09-16 PROCEDURE — 99999 PR PBB SHADOW E&M-EST. PATIENT-LVL III: CPT | Mod: PBBFAC,,, | Performed by: OBSTETRICS & GYNECOLOGY

## 2020-09-16 PROCEDURE — 99999 PR PBB SHADOW E&M-EST. PATIENT-LVL III: ICD-10-PCS | Mod: PBBFAC,,, | Performed by: OBSTETRICS & GYNECOLOGY

## 2020-09-16 PROCEDURE — 0503F PR POSTPARTUM CARE VISIT: ICD-10-PCS | Mod: TH,S$PBB,, | Performed by: OBSTETRICS & GYNECOLOGY

## 2020-09-16 PROCEDURE — 0503F POSTPARTUM CARE VISIT: CPT | Mod: TH,S$PBB,, | Performed by: OBSTETRICS & GYNECOLOGY

## 2020-09-16 NOTE — PROGRESS NOTES
"CC: Post-partum follow-up    Essie Bernal is a 30 y.o. female  who presents for post-partum visit.  She is S/P a Emergency PLTCS.  She and the baby are doing well.  No pain.  No fever.   No bowel / bladder complaints.  Infant is doing well in the NICU.    Delivery Date: 2020  Delivery MD: Kwame  Gender: female  Birth Weight: 2 pounds 3 ounces  Breast Feeding: YES  Depression: NO  Contraception: condoms    Pregnancy was complicated by:  Abruption, CHTN with super-imposed Pre-eclampsia    BP (!) 140/90   Ht 5' 2" (1.575 m)   Wt 74.9 kg (165 lb 2 oz)   LMP 2020   BMI 30.20 kg/m²     ROS:  GENERAL: No fever, chills, fatigability.  CARDIOVASCULAR: No chest pain. No shortness of breath. No leg cramps.  BREAST: Denies pain. No lumps. No discharge.  ABDOMEN: No abdominal pain. Denies nausea. Denies vomiting. No diarrhea. No constipation  URINARY: No incontinence, no nocturia, no frequency and no dysuria.  VULVAR: No pain, no lesions and no itching.  VAGINAL: No relaxation, no itching, no discharge, no abnormal bleeding and no lesions.  NEUROLOGICAL: No headaches. No vision changes.    PHYSICAL EXAM:  GENERAL: Alert and oriented in no distress  CHEST: Clear to auscultation  CV; Regular rate and rhythm  ABDOMEN:  Soft, non-tender, non-distended  WOUND: Healed well  VULVA:  Normal, no lesions  CERVIX:  Without lesions, polyps or tenderness.  UTERUS:  Normal size, shape, consistency, no mass or tenderness.  ADNEXA:  Normal in size without mass or tenderness  EXTREMITIES: Non-tender, Negative Michele's    IMP:  Doing well S/P Emergency PLTCS - Instructions / precautions reviewed  Contraceptive counseling - Pt will follow with Family Planning  HTN - BP stable on Labetalol - pt advised to follow up with PCP    PLAN:  May resume normal activities.      Return: Pt plans on following with Family Planning/Health Unit due to loss of insurance coverage      "

## 2024-09-19 NOTE — NURSING
Notified Dr. Post of pt increased blood pressure. Orders given to recheck in 30 minutes and if above 160/105 to notify Dr. Puente.   electronic

## (undated) DEVICE — PAD ABDOMINAL 8X7.5 STERILE

## (undated) DEVICE — TAPE SILK 3IN

## (undated) DEVICE — DRESSING AQUACEL AG ADV 3.5X12

## (undated) DEVICE — TAPE MEDIPORE 4IN X 2YDS